# Patient Record
Sex: MALE | Race: BLACK OR AFRICAN AMERICAN | NOT HISPANIC OR LATINO | URBAN - METROPOLITAN AREA
[De-identification: names, ages, dates, MRNs, and addresses within clinical notes are randomized per-mention and may not be internally consistent; named-entity substitution may affect disease eponyms.]

---

## 2021-10-08 ENCOUNTER — INPATIENT (INPATIENT)
Facility: HOSPITAL | Age: 46
LOS: 3 days | Discharge: HOME | DRG: 247 | End: 2021-10-12
Attending: INTERNAL MEDICINE | Admitting: INTERNAL MEDICINE
Payer: COMMERCIAL

## 2021-10-08 VITALS
SYSTOLIC BLOOD PRESSURE: 164 MMHG | HEIGHT: 66 IN | HEART RATE: 79 BPM | WEIGHT: 199.96 LBS | RESPIRATION RATE: 18 BRPM | OXYGEN SATURATION: 100 % | DIASTOLIC BLOOD PRESSURE: 81 MMHG | TEMPERATURE: 98 F

## 2021-10-08 DIAGNOSIS — I10 ESSENTIAL (PRIMARY) HYPERTENSION: ICD-10-CM

## 2021-10-08 DIAGNOSIS — R07.9 CHEST PAIN, UNSPECIFIED: ICD-10-CM

## 2021-10-08 DIAGNOSIS — D49.0 NEOPLASM OF UNSPECIFIED BEHAVIOR OF DIGESTIVE SYSTEM: ICD-10-CM

## 2021-10-08 DIAGNOSIS — Z90.49 ACQUIRED ABSENCE OF OTHER SPECIFIED PARTS OF DIGESTIVE TRACT: Chronic | ICD-10-CM

## 2021-10-08 DIAGNOSIS — I25.10 ATHEROSCLEROTIC HEART DISEASE OF NATIVE CORONARY ARTERY WITHOUT ANGINA PECTORIS: ICD-10-CM

## 2021-10-08 DIAGNOSIS — Q24.8 OTHER SPECIFIED CONGENITAL MALFORMATIONS OF HEART: ICD-10-CM

## 2021-10-08 LAB
ALBUMIN SERPL ELPH-MCNC: 4 G/DL — SIGNIFICANT CHANGE UP (ref 3.3–5)
ALP SERPL-CCNC: 65 U/L — SIGNIFICANT CHANGE UP (ref 40–120)
ALT FLD-CCNC: 43 U/L — SIGNIFICANT CHANGE UP (ref 10–45)
ANION GAP SERPL CALC-SCNC: 7 MMOL/L — SIGNIFICANT CHANGE UP (ref 5–17)
APTT BLD: 29.5 SEC — SIGNIFICANT CHANGE UP (ref 27.5–35.5)
AST SERPL-CCNC: 29 U/L — SIGNIFICANT CHANGE UP (ref 10–40)
BASOPHILS # BLD AUTO: 0.02 K/UL — SIGNIFICANT CHANGE UP (ref 0–0.2)
BASOPHILS NFR BLD AUTO: 0.4 % — SIGNIFICANT CHANGE UP (ref 0–2)
BILIRUB SERPL-MCNC: 0.2 MG/DL — SIGNIFICANT CHANGE UP (ref 0.2–1.2)
BUN SERPL-MCNC: 9 MG/DL — SIGNIFICANT CHANGE UP (ref 7–23)
CALCIUM SERPL-MCNC: 9.5 MG/DL — SIGNIFICANT CHANGE UP (ref 8.4–10.5)
CHLORIDE SERPL-SCNC: 106 MMOL/L — SIGNIFICANT CHANGE UP (ref 96–108)
CO2 SERPL-SCNC: 28 MMOL/L — SIGNIFICANT CHANGE UP (ref 22–31)
CREAT SERPL-MCNC: 1.02 MG/DL — SIGNIFICANT CHANGE UP (ref 0.5–1.3)
D DIMER BLD IA.RAPID-MCNC: 534 NG/ML DDU — HIGH
EOSINOPHIL # BLD AUTO: 0.21 K/UL — SIGNIFICANT CHANGE UP (ref 0–0.5)
EOSINOPHIL NFR BLD AUTO: 4.7 % — SIGNIFICANT CHANGE UP (ref 0–6)
GLUCOSE SERPL-MCNC: 94 MG/DL — SIGNIFICANT CHANGE UP (ref 70–99)
HCT VFR BLD CALC: 39.4 % — SIGNIFICANT CHANGE UP (ref 39–50)
HGB BLD-MCNC: 12.2 G/DL — LOW (ref 13–17)
IMM GRANULOCYTES NFR BLD AUTO: 0 % — SIGNIFICANT CHANGE UP (ref 0–1.5)
INR BLD: 1.02 — SIGNIFICANT CHANGE UP (ref 0.88–1.16)
LYMPHOCYTES # BLD AUTO: 1.94 K/UL — SIGNIFICANT CHANGE UP (ref 1–3.3)
LYMPHOCYTES # BLD AUTO: 43.4 % — SIGNIFICANT CHANGE UP (ref 13–44)
MCHC RBC-ENTMCNC: 28.7 PG — SIGNIFICANT CHANGE UP (ref 27–34)
MCHC RBC-ENTMCNC: 31 GM/DL — LOW (ref 32–36)
MCV RBC AUTO: 92.7 FL — SIGNIFICANT CHANGE UP (ref 80–100)
MONOCYTES # BLD AUTO: 0.43 K/UL — SIGNIFICANT CHANGE UP (ref 0–0.9)
MONOCYTES NFR BLD AUTO: 9.6 % — SIGNIFICANT CHANGE UP (ref 2–14)
NEUTROPHILS # BLD AUTO: 1.87 K/UL — SIGNIFICANT CHANGE UP (ref 1.8–7.4)
NEUTROPHILS NFR BLD AUTO: 41.9 % — LOW (ref 43–77)
NRBC # BLD: 0 /100 WBCS — SIGNIFICANT CHANGE UP (ref 0–0)
PLATELET # BLD AUTO: 220 K/UL — SIGNIFICANT CHANGE UP (ref 150–400)
POTASSIUM SERPL-MCNC: 4.5 MMOL/L — SIGNIFICANT CHANGE UP (ref 3.5–5.3)
POTASSIUM SERPL-SCNC: 4.5 MMOL/L — SIGNIFICANT CHANGE UP (ref 3.5–5.3)
PROT SERPL-MCNC: 7.6 G/DL — SIGNIFICANT CHANGE UP (ref 6–8.3)
PROTHROM AB SERPL-ACNC: 12.2 SEC — SIGNIFICANT CHANGE UP (ref 10.6–13.6)
RBC # BLD: 4.25 M/UL — SIGNIFICANT CHANGE UP (ref 4.2–5.8)
RBC # FLD: 12 % — SIGNIFICANT CHANGE UP (ref 10.3–14.5)
SARS-COV-2 RNA SPEC QL NAA+PROBE: NEGATIVE — SIGNIFICANT CHANGE UP
SODIUM SERPL-SCNC: 141 MMOL/L — SIGNIFICANT CHANGE UP (ref 135–145)
TROPONIN T SERPL-MCNC: 0.01 NG/ML — SIGNIFICANT CHANGE UP (ref 0–0.01)
TROPONIN T SERPL-MCNC: <0.01 NG/ML — SIGNIFICANT CHANGE UP (ref 0–0.01)
WBC # BLD: 4.47 K/UL — SIGNIFICANT CHANGE UP (ref 3.8–10.5)
WBC # FLD AUTO: 4.47 K/UL — SIGNIFICANT CHANGE UP (ref 3.8–10.5)

## 2021-10-08 PROCEDURE — 71046 X-RAY EXAM CHEST 2 VIEWS: CPT | Mod: 26

## 2021-10-08 PROCEDURE — G1004: CPT

## 2021-10-08 PROCEDURE — 71275 CT ANGIOGRAPHY CHEST: CPT | Mod: 26,ME

## 2021-10-08 PROCEDURE — 99285 EMERGENCY DEPT VISIT HI MDM: CPT

## 2021-10-08 RX ORDER — ENOXAPARIN SODIUM 100 MG/ML
40 INJECTION SUBCUTANEOUS DAILY
Refills: 0 | Status: DISCONTINUED | OUTPATIENT
Start: 2021-10-08 | End: 2021-10-10

## 2021-10-08 RX ORDER — NITROGLYCERIN 6.5 MG
0.5 CAPSULE, EXTENDED RELEASE ORAL ONCE
Refills: 0 | Status: COMPLETED | OUTPATIENT
Start: 2021-10-08 | End: 2021-10-09

## 2021-10-08 RX ORDER — METOPROLOL TARTRATE 50 MG
25 TABLET ORAL DAILY
Refills: 0 | Status: DISCONTINUED | OUTPATIENT
Start: 2021-10-08 | End: 2021-10-12

## 2021-10-08 RX ORDER — ATORVASTATIN CALCIUM 80 MG/1
40 TABLET, FILM COATED ORAL AT BEDTIME
Refills: 0 | Status: DISCONTINUED | OUTPATIENT
Start: 2021-10-08 | End: 2021-10-10

## 2021-10-08 RX ORDER — ASPIRIN/CALCIUM CARB/MAGNESIUM 324 MG
81 TABLET ORAL DAILY
Refills: 0 | Status: DISCONTINUED | OUTPATIENT
Start: 2021-10-08 | End: 2021-10-10

## 2021-10-08 RX ORDER — ASPIRIN/CALCIUM CARB/MAGNESIUM 324 MG
324 TABLET ORAL ONCE
Refills: 0 | Status: COMPLETED | OUTPATIENT
Start: 2021-10-08 | End: 2021-10-08

## 2021-10-08 RX ORDER — NITROGLYCERIN 6.5 MG
0.4 CAPSULE, EXTENDED RELEASE ORAL ONCE
Refills: 0 | Status: COMPLETED | OUTPATIENT
Start: 2021-10-08 | End: 2021-10-08

## 2021-10-08 RX ADMIN — Medication 25 MILLIGRAM(S): at 21:19

## 2021-10-08 RX ADMIN — ATORVASTATIN CALCIUM 40 MILLIGRAM(S): 80 TABLET, FILM COATED ORAL at 22:40

## 2021-10-08 RX ADMIN — Medication 0.4 MILLIGRAM(S): at 20:22

## 2021-10-08 RX ADMIN — ENOXAPARIN SODIUM 40 MILLIGRAM(S): 100 INJECTION SUBCUTANEOUS at 22:39

## 2021-10-08 NOTE — ED ADULT NURSE NOTE - NS PRO AD NO ADVANCE DIRECTIVE
ANTICOAGULATION FOLLOW-UP CLINIC VISIT    Patient Name:  Gerri Owens  Date:  3/2/2020  Contact Type:  Face to Face    SUBJECTIVE:  Patient Findings     Positives:   Other complaints (diarrhea)    Comments:   Has recently had some issues with stomach pain and diarrhea again. She has no bleeding/bruising and no new changes in diet/meds/activity        Clinical Outcomes     Negatives:   Major bleeding event, Thromboembolic event, Anticoagulation-related hospital admission, Anticoagulation-related ED visit, Anticoagulation-related fatality    Comments:   Has recently had some issues with stomach pain and diarrhea again. She has no bleeding/bruising and no new changes in diet/meds/activity           OBJECTIVE    INR Protime   Date Value Ref Range Status   03/02/2020 3.4 (A) 0.86 - 1.14 Final       ASSESSMENT / PLAN  INR assessment SUPRA diarrhea   Recheck INR In: 4 WEEKS    INR Location Clinic      Anticoagulation Summary  As of 3/2/2020    INR goal:   2.0-3.0   TTR:   64.0 % (1 y)   INR used for dosing:   3.4! (3/2/2020)   Warfarin maintenance plan:   2.5 mg (5 mg x 0.5) every Mon, Fri; 5 mg (5 mg x 1) all other days   Full warfarin instructions:   3/2: Hold; Otherwise 2.5 mg every Mon, Fri; 5 mg all other days   Weekly warfarin total:   30 mg   Plan last modified:   Tena Castillo RN (10/14/2019)   Next INR check:   3/27/2020   Priority:   Maintenance   Target end date:       Indications    DVT of upper extremity (deep vein thrombosis) (H) (Resolved) [I82.629]  Pulmonary embolus (H) [I26.99]             Anticoagulation Episode Summary     INR check location:       Preferred lab:       Send INR reminders to:   ARIANNA KIMBLE    Comments:   MTV 30 mcg. Consistent spinach and broccoli intake. DVT subclavian vein 2012. PE 7/2018. Brother positive Factor V Leiden. Another brother on lifetime AC. 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       Anticoagulation Care Providers     Provider Role Specialty  Phone number    Karo Doty MD [facility] [phone]            See the Encounter Report to view Anticoagulation Flowsheet and Dosing Calendar (Go to Encounters tab in chart review, and find the Anticoagulation Therapy Visit)        Tena Castillo RN                  Yes

## 2021-10-08 NOTE — ED PROVIDER NOTE - ATTENDING CONTRIBUTION TO CARE
chest pain. vss. ecg no ischemic changes. pt with intermittent pain. no pain at time of evaluation. labs noted. troponin negative. d dimer elevated. cxr no acute pathology.  will get cta to r/o pe. case d/w with Dr Jarrett. and negative stress test in August. if persistent pain will plan on admission.    pt to be admitted for further mgmt of chest pain r/o acs - discussed with Dr. Enriquez

## 2021-10-08 NOTE — ED PROVIDER NOTE - NSICDXPASTMEDICALHX_GEN_ALL_CORE_FT
PAST MEDICAL HISTORY:  No pertinent past medical history       HTN (hypertension)     Myocardial infarction

## 2021-10-08 NOTE — H&P ADULT - NSHPLABSRESULTS_GEN_ALL_CORE
12.2   4.47  )-----------( 220      ( 08 Oct 2021 15:26 )             39.4   10-08    141  |  106  |  9   ----------------------------<  94  4.5   |  28  |  1.02    Ca    9.5      08 Oct 2021 15:26    TPro  7.6  /  Alb  4.0  /  TBili  0.2  /  DBili  x   /  AST  29  /  ALT  43  /  AlkPhos  65  10-08

## 2021-10-08 NOTE — H&P ADULT - HISTORY OF PRESENT ILLNESS
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 Patient reports pain started last evening. He had one short lived episode and then resolved spontaneously. Patient reports pain came back this morning while making breakfast. Pain has waxed and whined all day. Pain comes for about 15-20 minutes and resolves spontaneously. Pain is a pressure in the center of his chest with no radiation. He has had some mild SOB while pain occurs. Nausea has been present as well. He denies palpitations, dizziness, edema or syncope. Pain in 2015 prior to PCI was much worse but with some similarities. Initial troponin was negative and 12 Lead EKG shows SR with NSST changes. D-Dimer was elevated at 534. CTA was performed and was negative for PE. There was coronary atherosclerosis present as well as 1.8 cystic lesion at the right posterior pericardium.

## 2021-10-08 NOTE — ED PROVIDER NOTE - OBJECTIVE STATEMENT
37 y/o M pt w/ PMHx of prior MI in 2015, normal stress test in August 14th, 2021, and HTN, presents to the ED c/o an episode of CP that started around 8:00AM this morning. Reports sharp, stabbing pain in the center of the chest. Notes that he has had 4 episodes of CP today with associated SOB when episodes occur. Relates that his symptoms have improved and is asymptomatic in the ED. Denies HA, dizziness, back pain, nausea, vomiting, diarrhea, abdominal pain, and leg pain or swelling.

## 2021-10-08 NOTE — H&P ADULT - PROBLEM SELECTOR PLAN 4
S/p Appendectomy  Plan for collectomy +/- Chemo at [facility] 11/2021

## 2021-10-08 NOTE — ED PROVIDER NOTE - CLINICAL SUMMARY MEDICAL DECISION MAKING FREE TEXT BOX
chest pain. vss. ecg no ischemic changes. pt with intermittent pain. no pain at time of evaluation. labs noted. troponin negative. d dimer elevated. cxr no acute pathology.  will get cta to r/o pe. case d/w with Dr Jarrett. and negative stress test in August. if persistent pain will plan on admission.

## 2021-10-08 NOTE — H&P ADULT - PROBLEM SELECTOR PLAN 2
Admit to tele  Serial trops   Cont ASA  Cont BB  Check 2D echo  Patient with normal Nuc 8/2021  Likely will need LHC as patient will need cardiac clearance for upcoming colon surgery next month

## 2021-10-08 NOTE — ED ADULT NURSE NOTE - OBJECTIVE STATEMENT
presents to ED ambulatory complaining of chest pain.  Pt reports chest pain that started this morning after waking up.  Pt reports the pain is associated with nausea and shortness of breath. Pt has hx of MI in the past and has not had chest pain like this since that time.  Pt denies dizziness, vomiting, cough, fever, chills.

## 2021-10-08 NOTE — ED PROVIDER NOTE - PROGRESS NOTE DETAILS
Dr. Araiza progress note:  I assumed care for patient following signout from ROSELIA Prasad.  Patient continues to have chest discomfort.  ASA, nitro SL ordered and case is discussed with on call cardiologist, Dr. Katz, who agrees in setting of known CAD with stent (2015), active chest discomfort, etc patient warrants further in patient work up.  Patient is aware of plan and in agreement.  Will admit at this time.

## 2021-10-08 NOTE — ED ADULT TRIAGE NOTE - OTHER COMPLAINTS
pt here for midsternal CP and mild SOB since this am, non radiating, no affected by activity, reports hx of MI with stents in 2015, also recently diagnosed with appendix CA and awaiting treatment, NAD noted in triage

## 2021-10-08 NOTE — H&P ADULT - ASSESSMENT
Patient is 46 year old male with prior CAD and PCI of RPDA who presents with atypical chest pain, admitted to [facility] to R/O ACS

## 2021-10-08 NOTE — ED PROVIDER NOTE - NSICDXFAMILYHX_GEN_ALL_CORE_FT
FAMILY HISTORY:  Grandparent  Still living? No  Family history of acute myocardial infarction, Age at diagnosis: 71-80

## 2021-10-08 NOTE — H&P ADULT - ATTENDING COMMENTS
chest pain   ekg and troponin are normal  will trial NTG to see if it improves his symptoms given his history  unclear if this is unstable angina

## 2021-10-09 LAB
ANION GAP SERPL CALC-SCNC: 10 MMOL/L — SIGNIFICANT CHANGE UP (ref 5–17)
BUN SERPL-MCNC: 10 MG/DL — SIGNIFICANT CHANGE UP (ref 7–23)
CALCIUM SERPL-MCNC: 9.4 MG/DL — SIGNIFICANT CHANGE UP (ref 8.4–10.5)
CHLORIDE SERPL-SCNC: 104 MMOL/L — SIGNIFICANT CHANGE UP (ref 96–108)
CO2 SERPL-SCNC: 24 MMOL/L — SIGNIFICANT CHANGE UP (ref 22–31)
COVID-19 SPIKE DOMAIN AB INTERP: POSITIVE
COVID-19 SPIKE DOMAIN ANTIBODY RESULT: >250 U/ML — HIGH
CREAT SERPL-MCNC: 0.98 MG/DL — SIGNIFICANT CHANGE UP (ref 0.5–1.3)
CRP SERPL-MCNC: <3 MG/L — SIGNIFICANT CHANGE UP (ref 0–4)
GLUCOSE SERPL-MCNC: 92 MG/DL — SIGNIFICANT CHANGE UP (ref 70–99)
HCT VFR BLD CALC: 39.9 % — SIGNIFICANT CHANGE UP (ref 39–50)
HGB BLD-MCNC: 12.2 G/DL — LOW (ref 13–17)
MCHC RBC-ENTMCNC: 28 PG — SIGNIFICANT CHANGE UP (ref 27–34)
MCHC RBC-ENTMCNC: 30.6 GM/DL — LOW (ref 32–36)
MCV RBC AUTO: 91.5 FL — SIGNIFICANT CHANGE UP (ref 80–100)
NRBC # BLD: 0 /100 WBCS — SIGNIFICANT CHANGE UP (ref 0–0)
PLATELET # BLD AUTO: 192 K/UL — SIGNIFICANT CHANGE UP (ref 150–400)
POTASSIUM SERPL-MCNC: 4.1 MMOL/L — SIGNIFICANT CHANGE UP (ref 3.5–5.3)
POTASSIUM SERPL-SCNC: 4.1 MMOL/L — SIGNIFICANT CHANGE UP (ref 3.5–5.3)
RBC # BLD: 4.36 M/UL — SIGNIFICANT CHANGE UP (ref 4.2–5.8)
RBC # FLD: 12.3 % — SIGNIFICANT CHANGE UP (ref 10.3–14.5)
SARS-COV-2 IGG+IGM SERPL QL IA: >250 U/ML — HIGH
SARS-COV-2 IGG+IGM SERPL QL IA: POSITIVE
SODIUM SERPL-SCNC: 138 MMOL/L — SIGNIFICANT CHANGE UP (ref 135–145)
TROPONIN T SERPL-MCNC: 0.01 NG/ML — SIGNIFICANT CHANGE UP (ref 0–0.01)
TROPONIN T SERPL-MCNC: <0.01 NG/ML — SIGNIFICANT CHANGE UP (ref 0–0.01)
WBC # BLD: 4.22 K/UL — SIGNIFICANT CHANGE UP (ref 3.8–10.5)
WBC # FLD AUTO: 4.22 K/UL — SIGNIFICANT CHANGE UP (ref 3.8–10.5)

## 2021-10-09 PROCEDURE — 93306 TTE W/DOPPLER COMPLETE: CPT | Mod: 26

## 2021-10-09 PROCEDURE — 99223 1ST HOSP IP/OBS HIGH 75: CPT

## 2021-10-09 PROCEDURE — 93010 ELECTROCARDIOGRAM REPORT: CPT

## 2021-10-09 RX ORDER — NITROGLYCERIN 6.5 MG
0.5 CAPSULE, EXTENDED RELEASE ORAL ONCE
Refills: 0 | Status: COMPLETED | OUTPATIENT
Start: 2021-10-09 | End: 2021-10-09

## 2021-10-09 RX ORDER — FAMOTIDINE 10 MG/ML
20 INJECTION INTRAVENOUS ONCE
Refills: 0 | Status: COMPLETED | OUTPATIENT
Start: 2021-10-09 | End: 2021-10-09

## 2021-10-09 RX ORDER — ACETAMINOPHEN 500 MG
650 TABLET ORAL ONCE
Refills: 0 | Status: COMPLETED | OUTPATIENT
Start: 2021-10-09 | End: 2021-10-09

## 2021-10-09 RX ORDER — PANTOPRAZOLE SODIUM 20 MG/1
40 TABLET, DELAYED RELEASE ORAL
Refills: 0 | Status: DISCONTINUED | OUTPATIENT
Start: 2021-10-10 | End: 2021-10-12

## 2021-10-09 RX ORDER — NITROGLYCERIN 6.5 MG
0.5 CAPSULE, EXTENDED RELEASE ORAL
Refills: 0 | Status: DISCONTINUED | OUTPATIENT
Start: 2021-10-09 | End: 2021-10-10

## 2021-10-09 RX ADMIN — Medication 30 MILLILITER(S): at 14:22

## 2021-10-09 RX ADMIN — Medication 0.5 INCH(S): at 21:28

## 2021-10-09 RX ADMIN — FAMOTIDINE 20 MILLIGRAM(S): 10 INJECTION INTRAVENOUS at 14:22

## 2021-10-09 RX ADMIN — ATORVASTATIN CALCIUM 40 MILLIGRAM(S): 80 TABLET, FILM COATED ORAL at 21:29

## 2021-10-09 RX ADMIN — Medication 0.5 INCH(S): at 11:51

## 2021-10-09 RX ADMIN — Medication 25 MILLIGRAM(S): at 18:43

## 2021-10-09 RX ADMIN — Medication 0.5 INCH(S): at 09:41

## 2021-10-09 RX ADMIN — Medication 0.5 INCH(S): at 01:01

## 2021-10-09 RX ADMIN — Medication 650 MILLIGRAM(S): at 14:53

## 2021-10-09 RX ADMIN — ENOXAPARIN SODIUM 40 MILLIGRAM(S): 100 INJECTION SUBCUTANEOUS at 21:29

## 2021-10-09 RX ADMIN — Medication 0.5 INCH(S): at 22:09

## 2021-10-09 RX ADMIN — Medication 650 MILLIGRAM(S): at 15:50

## 2021-10-09 RX ADMIN — Medication 81 MILLIGRAM(S): at 11:53

## 2021-10-09 NOTE — PROGRESS NOTE ADULT - ASSESSMENT
Patient is 46 year old male with prior CAD and PCI of RPDA who presents with atypical chest pain, admitted to [facility] to R/O ACS

## 2021-10-09 NOTE — PROGRESS NOTE ADULT - SUBJECTIVE AND OBJECTIVE BOX
Patient started back on her Jardiance since her blood sugars were going up into the high 200's and she was not about to let her BS's get high since she is having a colonoscopy on 12.22. She decided to stay on this medication and then speak with her NP about weaning off of it and only being on the Actos. She had several questions about the prep before the colonoscopy and we reviewed some tips and tricks of how to get through it but have clear stools before having the procedure done. Plan: Will contact patient after her colonoscopy and check on how she is doing, if stable and results are normal will close the case.     Claudine Espinoza, MSN, BSN, RN  Nurse [facility]  [phone] Interventional Cardiology PA Adult Progress Note    cc: chest pain    Subjective Assessment: Seen and examined. Patient has been c/o on and off substernal chest pain, occasionally related to food intake. Denies SOB, dizziness.   No EKG changes. Trops negative  	     ROS negative except per subjective and HPI    MEDICATIONS:  metoprolol succinate ER 25 milliGRAM(s) Oral daily  nitroglycerin    2% Ointment 0.5 Inch(s) Transdermal two times a day          aluminum hydroxide/magnesium hydroxide/simethicone Suspension 30 milliLiter(s) Oral every 6 hours PRN  famotidine Injectable 20 milliGRAM(s) IV Push once    atorvastatin 40 milliGRAM(s) Oral at bedtime    aspirin  chewable 81 milliGRAM(s) Oral daily  enoxaparin Injectable 40 milliGRAM(s) SubCutaneous daily      	    [PHYSICAL EXAM:  TELEMETRY:  T(C): 37 (10-09-21 @ 09:46), Max: 37 (10-08-21 @ 20:09)  HR: 66 (10-09-21 @ 12:00) (59 - 79)  BP: 103/66 (10-09-21 @ 12:00) (94/63 - 164/81)  RR: 18 (10-09-21 @ 12:00) (14 - 18)  SpO2: 100% (10-09-21 @ 12:00) (98% - 100%)  Wt(kg): --  I&O's Summary    08 Oct 2021 07:01  -  09 Oct 2021 07:00  --------------------------------------------------------  IN: 170 mL / OUT: 200 mL / NET: -30 mL    09 Oct 2021 07:01  -  09 Oct 2021 13:37  --------------------------------------------------------  IN: 180 mL / OUT: 200 mL / NET: -20 mL      Height (cm): 167.6 (10-09 @ 00:13)  Weight (kg): 92.4 (10-09 @ 00:13)  BMI (kg/m2): 32.9 (10-09 @ 00:13)  BSA (m2): 2.02 (10-09 @ 00:13)  [place]rete:  Central/PICC/Mid Line:                                         Appearance: Normal	  HEENT:   Normal oral mucosa, PERRL, EOMI	  Neck: Supple, - JVD;   Cardiovascular: Normal S1 S2, No JVD, No murmurs,   Respiratory: Lungs clear to auscultation, No Rales, Rhonchi, Wheezing	  Gastrointestinal:  Soft, Non-tender, bowel sounds present	  Skin: No rashes, No ecchymoses, No cyanosis  Extremities: Normal range of motion, No clubbing, cyanosis or edema  Neurologic: Non-focal  Psychiatry: A & O x 3, Mood & affect appropriate      	    LABS:	 	  CARDIAC MARKERS:                                  12.2   4.22  )-----------( 192      ( 09 Oct 2021 06:58 )             39.9     10-09    138  |  104  |  10  ----------------------------<  92  4.1   |  24  |  0.98    Ca    9.4      09 Oct 2021 06:58    TPro  7.6  /  Alb  4.0  /  TBili  0.2  /  DBili  x   /  AST  29  /  ALT  43  /  AlkPhos  65  10-08      PT/INR - ( 08 Oct 2021 15:26 )   PT: 12.2 sec;   INR: 1.02          PTT - ( 08 Oct 2021 15:26 )  PTT:29.5 sec     Interventional Cardiology PA Adult Progress Note    cc: chest pain    Subjective Assessment: Seen and examined. Patient has been c/o on and off substernal chest pain, occasionally related to food intake. Denies SOB, dizziness.   No EKG changes. Trops negative. Given NTG paste, Pepcid IV 20 mg x 1 and Mylanta    At 3 PM Pt c/o chest pain. EKG showed new Tw inversions leads II, III, V4-V6  	     ROS negative except per subjective and HPI    MEDICATIONS:  metoprolol succinate ER 25 milliGRAM(s) Oral daily  nitroglycerin    2% Ointment 0.5 Inch(s) Transdermal two times a day          aluminum hydroxide/magnesium hydroxide/simethicone Suspension 30 milliLiter(s) Oral every 6 hours PRN  famotidine Injectable 20 milliGRAM(s) IV Push once    atorvastatin 40 milliGRAM(s) Oral at bedtime    aspirin  chewable 81 milliGRAM(s) Oral daily  enoxaparin Injectable 40 milliGRAM(s) SubCutaneous daily      	    [PHYSICAL EXAM:  TELEMETRY:  T(C): 37 (10-09-21 @ 09:46), Max: 37 (10-08-21 @ 20:09)  HR: 66 (10-09-21 @ 12:00) (59 - 79)  BP: 103/66 (10-09-21 @ 12:00) (94/63 - 164/81)  RR: 18 (10-09-21 @ 12:00) (14 - 18)  SpO2: 100% (10-09-21 @ 12:00) (98% - 100%)  Wt(kg): --  I&O's Summary    08 Oct 2021 07:01  -  09 Oct 2021 07:00  --------------------------------------------------------  IN: 170 mL / OUT: 200 mL / NET: -30 mL    09 Oct 2021 07:01  -  09 Oct 2021 13:37  --------------------------------------------------------  IN: 180 mL / OUT: 200 mL / NET: -20 mL      Height (cm): 167.6 (10-09 @ 00:13)  Weight (kg): 92.4 (10-09 @ 00:13)  BMI (kg/m2): 32.9 (10-09 @ 00:13)  BSA (m2): 2.02 (10-09 @ 00:13)  [place]erget[place]:  Central/PICC/Mid Line:                                         Appearance: Normal	  HEENT:   Normal oral mucosa, PERRL, EOMI	  Neck: Supple, - JVD;   Cardiovascular: Normal S1 S2, No JVD, No murmurs,   Respiratory: Lungs clear to auscultation, No Rales, Rhonchi, Wheezing	  Gastrointestinal:  Soft, Non-tender, bowel sounds present	  Skin: No rashes, No ecchymoses, No cyanosis  Extremities: Normal range of motion, No clubbing, cyanosis or edema  Neurologic: Non-focal  Psychiatry: A & O x 3, Mood & affect appropriate        Echo: < from: TTE Echo Complete w/o Contrast w/ Doppler (10.09.21 @ 12:38) >     1. The left ventricle is normal in size, wall thickness, and systolic function with a calculated ejection fraction of 55-60%.   2. Normal right ventricular size and systolic function.   3. No significant valvular disease.   4. No pericardial effusion.        	    LABS:	 	  CARDIAC MARKERS:                                  12.2   4.22  )-----------( 192      ( 09 Oct 2021 06:58 )             39.9     10-09    138  |  104  |  10  ----------------------------<  92  4.1   |  24  |  0.98    Ca    9.4      09 Oct 2021 06:58    TPro  7.6  /  Alb  4.0  /  TBili  0.2  /  DBili  x   /  AST  29  /  ALT  43  /  AlkPhos  65  10-08      PT/INR - ( 08 Oct 2021 15:26 )   PT: 12.2 sec;   INR: 1.02          PTT - ( 08 Oct 2021 15:26 )  PTT:29.5 sec

## 2021-10-09 NOTE — PROGRESS NOTE ADULT - PROBLEM SELECTOR PLAN 2
f/u 2D echo report  Patient with normal Nuc 8/2021  EKG normal  Trops negative  - consider indigestion: will give Pepcid 20 mg IV x 1, Mylanta po and start Protonix 40 mg po daily tomorrow f/u 2D echo report see above  Patient with normal Nuc 8/2021  EKG changes today at 3 PM  Trops repeat pending

## 2021-10-09 NOTE — PATIENT PROFILE ADULT - VISION (WITH CORRECTIVE LENSES IF THE PATIENT USUALLY WEARS THEM):
daily wear/Partially impaired: cannot see medication labels or newsprint, but can see obstacles in path, and the surrounding layout; can count fingers at arm's length

## 2021-10-10 LAB
ANION GAP SERPL CALC-SCNC: 9 MMOL/L — SIGNIFICANT CHANGE UP (ref 5–17)
APTT BLD: 33.5 SEC — SIGNIFICANT CHANGE UP (ref 27.5–35.5)
BUN SERPL-MCNC: 15 MG/DL — SIGNIFICANT CHANGE UP (ref 7–23)
CALCIUM SERPL-MCNC: 9.4 MG/DL — SIGNIFICANT CHANGE UP (ref 8.4–10.5)
CHLORIDE SERPL-SCNC: 105 MMOL/L — SIGNIFICANT CHANGE UP (ref 96–108)
CHOLEST SERPL-MCNC: 173 MG/DL — SIGNIFICANT CHANGE UP
CO2 SERPL-SCNC: 28 MMOL/L — SIGNIFICANT CHANGE UP (ref 22–31)
CREAT SERPL-MCNC: 1.24 MG/DL — SIGNIFICANT CHANGE UP (ref 0.5–1.3)
ERYTHROCYTE [SEDIMENTATION RATE] IN BLOOD: 40 MM/HR — HIGH
GLUCOSE SERPL-MCNC: 97 MG/DL — SIGNIFICANT CHANGE UP (ref 70–99)
HCT VFR BLD CALC: 42.5 % — SIGNIFICANT CHANGE UP (ref 39–50)
HDLC SERPL-MCNC: 43 MG/DL — SIGNIFICANT CHANGE UP
HGB BLD-MCNC: 12.8 G/DL — LOW (ref 13–17)
INR BLD: 1.01 — SIGNIFICANT CHANGE UP (ref 0.88–1.16)
LIPID PNL WITH DIRECT LDL SERPL: 109 MG/DL — HIGH
MAGNESIUM SERPL-MCNC: 2.1 MG/DL — SIGNIFICANT CHANGE UP (ref 1.6–2.6)
MCHC RBC-ENTMCNC: 28.1 PG — SIGNIFICANT CHANGE UP (ref 27–34)
MCHC RBC-ENTMCNC: 30.1 GM/DL — LOW (ref 32–36)
MCV RBC AUTO: 93.4 FL — SIGNIFICANT CHANGE UP (ref 80–100)
NON HDL CHOLESTEROL: 130 MG/DL — HIGH
NRBC # BLD: 0 /100 WBCS — SIGNIFICANT CHANGE UP (ref 0–0)
PLATELET # BLD AUTO: 178 K/UL — SIGNIFICANT CHANGE UP (ref 150–400)
POTASSIUM SERPL-MCNC: 4.2 MMOL/L — SIGNIFICANT CHANGE UP (ref 3.5–5.3)
POTASSIUM SERPL-SCNC: 4.2 MMOL/L — SIGNIFICANT CHANGE UP (ref 3.5–5.3)
PROTHROM AB SERPL-ACNC: 12.1 SEC — SIGNIFICANT CHANGE UP (ref 10.6–13.6)
RBC # BLD: 4.55 M/UL — SIGNIFICANT CHANGE UP (ref 4.2–5.8)
RBC # FLD: 12.3 % — SIGNIFICANT CHANGE UP (ref 10.3–14.5)
SODIUM SERPL-SCNC: 142 MMOL/L — SIGNIFICANT CHANGE UP (ref 135–145)
TRIGL SERPL-MCNC: 105 MG/DL — SIGNIFICANT CHANGE UP
WBC # BLD: 4.19 K/UL — SIGNIFICANT CHANGE UP (ref 3.8–10.5)
WBC # FLD AUTO: 4.19 K/UL — SIGNIFICANT CHANGE UP (ref 3.8–10.5)

## 2021-10-10 PROCEDURE — 99233 SBSQ HOSP IP/OBS HIGH 50: CPT

## 2021-10-10 RX ORDER — SODIUM CHLORIDE 9 MG/ML
500 INJECTION INTRAMUSCULAR; INTRAVENOUS; SUBCUTANEOUS
Refills: 0 | Status: DISCONTINUED | OUTPATIENT
Start: 2021-10-11 | End: 2021-10-11

## 2021-10-10 RX ORDER — HEPARIN SODIUM 5000 [USP'U]/ML
5000 INJECTION INTRAVENOUS; SUBCUTANEOUS ONCE
Refills: 0 | Status: COMPLETED | OUTPATIENT
Start: 2021-10-10 | End: 2021-10-10

## 2021-10-10 RX ORDER — ASPIRIN/CALCIUM CARB/MAGNESIUM 324 MG
81 TABLET ORAL DAILY
Refills: 0 | Status: DISCONTINUED | OUTPATIENT
Start: 2021-10-10 | End: 2021-10-12

## 2021-10-10 RX ORDER — MORPHINE SULFATE 50 MG/1
2 CAPSULE, EXTENDED RELEASE ORAL ONCE
Refills: 0 | Status: DISCONTINUED | OUTPATIENT
Start: 2021-10-10 | End: 2021-10-10

## 2021-10-10 RX ORDER — ATORVASTATIN CALCIUM 80 MG/1
80 TABLET, FILM COATED ORAL AT BEDTIME
Refills: 0 | Status: DISCONTINUED | OUTPATIENT
Start: 2021-10-10 | End: 2021-10-12

## 2021-10-10 RX ORDER — NITROGLYCERIN 6.5 MG
0.5 CAPSULE, EXTENDED RELEASE ORAL
Refills: 0 | Status: DISCONTINUED | OUTPATIENT
Start: 2021-10-10 | End: 2021-10-12

## 2021-10-10 RX ADMIN — Medication 50 MILLIGRAM(S): at 23:39

## 2021-10-10 RX ADMIN — Medication 0.5 INCH(S): at 17:46

## 2021-10-10 RX ADMIN — Medication 81 MILLIGRAM(S): at 13:21

## 2021-10-10 RX ADMIN — Medication 0.5 INCH(S): at 10:08

## 2021-10-10 RX ADMIN — Medication 25 MILLIGRAM(S): at 17:46

## 2021-10-10 RX ADMIN — ATORVASTATIN CALCIUM 80 MILLIGRAM(S): 80 TABLET, FILM COATED ORAL at 21:21

## 2021-10-10 RX ADMIN — HEPARIN SODIUM 5000 UNIT(S): 5000 INJECTION INTRAVENOUS; SUBCUTANEOUS at 21:21

## 2021-10-10 RX ADMIN — Medication 50 MILLIGRAM(S): at 17:47

## 2021-10-10 RX ADMIN — Medication 0.5 INCH(S): at 20:35

## 2021-10-10 RX ADMIN — PANTOPRAZOLE SODIUM 40 MILLIGRAM(S): 20 TABLET, DELAYED RELEASE ORAL at 05:51

## 2021-10-10 RX ADMIN — Medication 81 MILLIGRAM(S): at 11:43

## 2021-10-10 NOTE — PROGRESS NOTE ADULT - ASSESSMENT
Patient is 46 year old male with prior CAD and PCI of RPDA who presents with atypical chest pain, admitted to [facility] to R/O ACS 45 yo M with PMHx HLD, CAD s/p NSTEMI    and PCI of RPDA who presents with atypical chest pain, admitted to [facility] to R/O ACS 47 yo M with PMHx HLD, CAD s/p NSTEMI (s/p cardiac cath 10/2015 revealing dual PDA system with 100% prox thrombotic occlusion of most inferior PDA s/p thrombectomy/JENN & insertion of IABP for coronary perfusion x 18 hours, residual pRamus 50% eccentric disease), recent appendectomy for appendicitis 9/2021 and 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 Cameron) who presented with atypical chest pain, trop neg x2, ECG with TWI in inferior leads, plan for cardiac cath with Dr. Boyle tomorrow.

## 2021-10-10 NOTE — PROGRESS NOTE ADULT - PROBLEM SELECTOR PLAN 4
S/p Appendectomy  Plan for collectomy +/- Chemo at [facility] 11/2021
S/p Appendectomy  Plan for collectomy +/- Chemo at [facility] 11/2021

## 2021-10-10 NOTE — PROGRESS NOTE ADULT - PROBLEM SELECTOR PLAN 2
f/u 2D echo report see above  Patient with normal Nuc 8/2021  EKG changes today at 3 PM  Trops repeat pending S/p Appendectomy with finding of appendicle mallignancy  - Plan for collectomy +/- Chemo at [facility] 11/2021  - Plan to reach out to Dr. Freed 10/11 as above

## 2021-10-10 NOTE — PROGRESS NOTE ADULT - SUBJECTIVE AND OBJECTIVE BOX
Interventional Cardiology PA Adult Progress Note    Subjective Assessment:  Pt seen and evaluated at bedside. No chest pain last night or this AM. c/o ongoing episodes of nausea since appendectomy. Wondering if he will be going for cath tomorrow. Denies SOB, dizziness, palpitations, N/V, abdominal pain. All other ROS otherwise negative except per subjective.   	  MEDICATIONS:  metoprolol succinate ER 25 milliGRAM(s) Oral daily  aluminum hydroxide/magnesium hydroxide/simethicone Suspension 30 milliLiter(s) Oral every 6 hours PRN  pantoprazole    Tablet 40 milliGRAM(s) Oral before breakfast  atorvastatin 40 milliGRAM(s) Oral at bedtime  aspirin  chewable 81 milliGRAM(s) Oral daily  enoxaparin Injectable 40 milliGRAM(s) SubCutaneous daily      [PHYSICAL EXAM:  TELEMETRY:  T(C): 36.7 (10-10-21 @ 06:22), Max: 37.4 (10-09-21 @ 18:07)  HR: 66 (10-10-21 @ 10:05) (63 - 87)  BP: 125/73 (10-10-21 @ 10:05) (92/61 - 125/73)  RR: 18 (10-10-21 @ 10:05) (17 - 18)  SpO2: 96% (10-10-21 @ 10:05) (96% - 100%)  Wt(kg): --  I&O's Summary    09 Oct 2021 07:01  -  10 Oct 2021 07:00  --------------------------------------------------------  IN: 600 mL / OUT: 200 mL / NET: 400 mL    Appearance: man sitting in bed in NAD  HEENT:   EOMI	  Neck:  - JVD  Cardiovascular: Normal S1 S2, No murmurs,   Respiratory: CTA B/L, no w/r/r  Gastrointestinal:  Soft, Non-tender, + BS	x4  Skin: No rashes, No ecchymoses, No cyanosis  Extremities: Normal range of motion, No pedal edema B/L  Vascular: DP/PT 1+ B/L  Neurologic: Non-focal  Psychiatry: A & O x 3, Mood & affect appropriate                            12.8   4.19  )-----------( 178      ( 10 Oct 2021 06:05 )             42.5     10-10    142  |  105  |  15  ----------------------------<  97  4.2   |  28  |  1.24    Ca    9.4      10 Oct 2021 06:05  Mg     2.1     10-10    TPro  7.6  /  Alb  4.0  /  TBili  0.2  /  DBili  x   /  AST  29  /  ALT  43  /  AlkPhos  65  10-08  PT/INR - ( 10 Oct 2021 06:05 )   PT: 12.1 sec;   INR: 1.01     PTT - ( 10 Oct 2021 06:05 )  PTT:33.5 sec

## 2021-10-10 NOTE — PROGRESS NOTE ADULT - PROBLEM SELECTOR PLAN 3
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 Katz & curbsided Dr. Rodriguez, likely not causing sx.    F: NS @ 75 cc/hr x 4 hours pre-cath  E: Replete K+<4 and/or Mg < 2  N: DASH    DVT prophylaxis: Lovenox 40 mg SUBQ QD, transitioning to Heparin SUBQ pending cath  Dispo: pending cath    Case d/w Dr. Katz

## 2021-10-10 NOTE — PROGRESS NOTE ADULT - PROBLEM SELECTOR PLAN 1
Hx of premature CAD S/P JENN to RPDA in 2015  Cont STATIN Episode of SSCP with GAB inferolateral leads   - Trop neg x3  - ECHO 10/9/21: LVEF 55-60%,no significant valvular disease.   - CXR 10/8/21: No acute cardiopulmonary disease process.  - CT PE 10/8/21: no PE  - Pt endorses normal NST 8/2021.   - Cardiac cath @ [facility] with Dr. Boyle 10/2015: LMCA normal, LAD mild LI, pLCx LI, pRamus 50%, dual PDA system with 100% prox thrombotic occlusion of most inferior PDA s/p thrombectomy/JENN with insertion of IABP x 18 hours post-procedure.   - Received ASA 81 mg x2 since admit, will provide additional dose today and then continue 10/11 AM to complete loading dose. Plan to reach out to Surgeon Dr. Freed 10/11 AM to ensure they are able push back colectomy x3 months if severe lesion found on cath. Load with Plavix tomorrow pending discussion with Dr. Freed.   - Takes Crestor 10  mg PO QD at home, Atorvastatin titrated to 80 mg PO QD (plan to discharge on Crestor 40 mg PO QD). c/w Toprol XL 25 mg PO QD. Episode of SSCP improved with NTG paste on 10/9/21  - Trop neg x3  - ECHO 10/9/21: LVEF 55-60%,no significant valvular disease.   - CXR 10/8/21: No acute cardiopulmonary disease process.  - ECG 10/10/21: NSR @ 73 BPM with TWI/Q waves in inferior leads (flattened T waves in inferior leads on admit)  - CT PE 10/8/21: no PE  - Pt endorses normal NST 8/2021.   - Cardiac cath @ [facility] with Dr. Boyle 10/2015: LMCA normal, LAD mild LI, pLCx LI, pRamus 50%, dual PDA system with 100% prox thrombotic occlusion of most inferior PDA s/p thrombectomy/JENN with insertion of IABP x 18 hours post-procedure.   - Received ASA 81 mg x2 since admit, will provide additional dose today and then continue 10/11 AM to complete loading dose. Plan to reach out to Surgeon Dr. Freed 10/11 AM to ensure they are able push back colectomy x3 months if severe lesion found on cath. Load with Plavix tomorrow pending discussion with Dr. Freed.   - Takes Crestor 10  mg PO QD at home, Atorvastatin titrated to 80 mg PO QD (plan to discharge on Crestor 40 mg PO QD). c/w Toprol XL 25 mg PO QD.

## 2021-10-11 ENCOUNTER — TRANSCRIPTION ENCOUNTER (OUTPATIENT)
Age: 46
End: 2021-10-11

## 2021-10-11 LAB
ANION GAP SERPL CALC-SCNC: 12 MMOL/L — SIGNIFICANT CHANGE UP (ref 5–17)
APTT BLD: 32.2 SEC — SIGNIFICANT CHANGE UP (ref 27.5–35.5)
BUN SERPL-MCNC: 13 MG/DL — SIGNIFICANT CHANGE UP (ref 7–23)
CALCIUM SERPL-MCNC: 9.6 MG/DL — SIGNIFICANT CHANGE UP (ref 8.4–10.5)
CHLORIDE SERPL-SCNC: 105 MMOL/L — SIGNIFICANT CHANGE UP (ref 96–108)
CO2 SERPL-SCNC: 25 MMOL/L — SIGNIFICANT CHANGE UP (ref 22–31)
CREAT SERPL-MCNC: 0.94 MG/DL — SIGNIFICANT CHANGE UP (ref 0.5–1.3)
GLUCOSE SERPL-MCNC: 128 MG/DL — HIGH (ref 70–99)
HCT VFR BLD CALC: 42.7 % — SIGNIFICANT CHANGE UP (ref 39–50)
HGB BLD-MCNC: 13.4 G/DL — SIGNIFICANT CHANGE UP (ref 13–17)
INR BLD: 1.06 — SIGNIFICANT CHANGE UP (ref 0.88–1.16)
MAGNESIUM SERPL-MCNC: 2 MG/DL — SIGNIFICANT CHANGE UP (ref 1.6–2.6)
MCHC RBC-ENTMCNC: 28.9 PG — SIGNIFICANT CHANGE UP (ref 27–34)
MCHC RBC-ENTMCNC: 31.4 GM/DL — LOW (ref 32–36)
MCV RBC AUTO: 92.2 FL — SIGNIFICANT CHANGE UP (ref 80–100)
NRBC # BLD: 0 /100 WBCS — SIGNIFICANT CHANGE UP (ref 0–0)
PLATELET # BLD AUTO: 188 K/UL — SIGNIFICANT CHANGE UP (ref 150–400)
POTASSIUM SERPL-MCNC: 4.8 MMOL/L — SIGNIFICANT CHANGE UP (ref 3.5–5.3)
POTASSIUM SERPL-SCNC: 4.8 MMOL/L — SIGNIFICANT CHANGE UP (ref 3.5–5.3)
PROTHROM AB SERPL-ACNC: 12.7 SEC — SIGNIFICANT CHANGE UP (ref 10.6–13.6)
RBC # BLD: 4.63 M/UL — SIGNIFICANT CHANGE UP (ref 4.2–5.8)
RBC # FLD: 12.2 % — SIGNIFICANT CHANGE UP (ref 10.3–14.5)
SODIUM SERPL-SCNC: 142 MMOL/L — SIGNIFICANT CHANGE UP (ref 135–145)
WBC # BLD: 7.76 K/UL — SIGNIFICANT CHANGE UP (ref 3.8–10.5)
WBC # FLD AUTO: 7.76 K/UL — SIGNIFICANT CHANGE UP (ref 3.8–10.5)

## 2021-10-11 PROCEDURE — 99152 MOD SED SAME PHYS/QHP 5/>YRS: CPT

## 2021-10-11 PROCEDURE — 92928 PRQ TCAT PLMT NTRAC ST 1 LES: CPT | Mod: RI

## 2021-10-11 PROCEDURE — 93458 L HRT ARTERY/VENTRICLE ANGIO: CPT | Mod: 26,59

## 2021-10-11 PROCEDURE — 92920 PRQ TRLUML C ANGIOP 1ART&/BR: CPT | Mod: RC

## 2021-10-11 PROCEDURE — 99233 SBSQ HOSP IP/OBS HIGH 50: CPT

## 2021-10-11 RX ORDER — CLOPIDOGREL BISULFATE 75 MG/1
600 TABLET, FILM COATED ORAL ONCE
Refills: 0 | Status: COMPLETED | OUTPATIENT
Start: 2021-10-11 | End: 2021-10-11

## 2021-10-11 RX ORDER — CLOPIDOGREL BISULFATE 75 MG/1
75 TABLET, FILM COATED ORAL DAILY
Refills: 0 | Status: DISCONTINUED | OUTPATIENT
Start: 2021-10-12 | End: 2021-10-12

## 2021-10-11 RX ORDER — DIPHENHYDRAMINE HCL 50 MG
50 CAPSULE ORAL ONCE
Refills: 0 | Status: DISCONTINUED | OUTPATIENT
Start: 2021-10-11 | End: 2021-10-12

## 2021-10-11 RX ORDER — SODIUM CHLORIDE 9 MG/ML
1000 INJECTION INTRAMUSCULAR; INTRAVENOUS; SUBCUTANEOUS
Refills: 0 | Status: DISCONTINUED | OUTPATIENT
Start: 2021-10-11 | End: 2021-10-12

## 2021-10-11 RX ADMIN — CLOPIDOGREL BISULFATE 600 MILLIGRAM(S): 75 TABLET, FILM COATED ORAL at 13:50

## 2021-10-11 RX ADMIN — Medication 25 MILLIGRAM(S): at 22:16

## 2021-10-11 RX ADMIN — Medication 0.5 INCH(S): at 05:59

## 2021-10-11 RX ADMIN — PANTOPRAZOLE SODIUM 40 MILLIGRAM(S): 20 TABLET, DELAYED RELEASE ORAL at 06:01

## 2021-10-11 RX ADMIN — Medication 0.5 INCH(S): at 06:02

## 2021-10-11 RX ADMIN — SODIUM CHLORIDE 75 MILLILITER(S): 9 INJECTION INTRAMUSCULAR; INTRAVENOUS; SUBCUTANEOUS at 00:01

## 2021-10-11 RX ADMIN — Medication 50 MILLIGRAM(S): at 13:50

## 2021-10-11 RX ADMIN — Medication 50 MILLIGRAM(S): at 06:01

## 2021-10-11 RX ADMIN — Medication 81 MILLIGRAM(S): at 06:03

## 2021-10-11 RX ADMIN — ATORVASTATIN CALCIUM 80 MILLIGRAM(S): 80 TABLET, FILM COATED ORAL at 22:16

## 2021-10-11 NOTE — PROGRESS NOTE ADULT - TIME BILLING
chest pain due to cad
cardiac chest pain feels better has new Twave inversions  will cath
chest pain with new t wave inversions  increase atorvastatin   he is on bb   hydrate  plan on dc on lisinopril 2.5 mg once a day

## 2021-10-11 NOTE — PROGRESS NOTE ADULT - PROBLEM SELECTOR PLAN 3
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 Katz & curbsided Dr. Rodriguez, likely not causing sx.    F: NS @ 75 cc/hr x 4 hours pre-cath  E: Replete K+<4 and/or Mg < 2  N: DASH    DVT prophylaxis: Holding for cath  Dispo: pending cath    Case d/w Dr. Katz, Dr. Becker

## 2021-10-11 NOTE — PROGRESS NOTE ADULT - PROBLEM SELECTOR PLAN 1
Episode of SSCP improved with NTG paste on 10/9/21 & 10/10/21, CP free today  - Trop neg x3  - ECHO 10/9/21: LVEF 55-60%,no significant valvular disease.   - CXR 10/8/21: No acute cardiopulmonary disease process.  - ECG 10/10/21: NSR @ 73 BPM with TWI/Q waves in inferior leads (flattened T waves in inferior leads on admit)  - CT PE 10/8/21: no PE  - Pt endorses normal NST 8/2021.   - Cardiac cath @ [facility] with Dr. Boyle 10/2015: LMCA normal, LAD mild LI, pLCx LI, pRamus 50%, dual PDA system with 100% prox thrombotic occlusion of most inferior PDA s/p thrombectomy/JENN with insertion of IABP x 18 hours post-procedure.   - Spoke to Surgeon Dr. Cameron's team 10/11 AM, OK to go forward with cath today, will have to push colectomy if severe disease requiring stent, communicate with Dr. Cameron's team re: results of cath  - s/p ASA/Plavix load. Plan for cath today with Dr. Boyle. Consented. Added to schedule.   - Takes Crestor 10  mg PO QD at home, Atorvastatin titrated to 80 mg PO QD (plan to discharge on Crestor 40 mg PO QD).   - c/w Toprol XL 25 mg PO QD. Will need low dose ACEi/ARB prior to discharge given previous NSTEMI

## 2021-10-11 NOTE — PROGRESS NOTE ADULT - ATTENDING COMMENTS
another episode of chest pain   will cath   he is on gdmt
Has new t wave inversions  will cath  I will reach out to his surgeon to ensure if we need to hold surgery for three months
chest pain   will cath

## 2021-10-11 NOTE — PROGRESS NOTE ADULT - PROBLEM SELECTOR PLAN 2
S/p Appendectomy with finding of appendicle malignancy  - Plan for collectomy +/- Chemo at [facility] 11/2021  - Plan to reach out to Dr. Freed's team with results of cath

## 2021-10-11 NOTE — DISCHARGE NOTE PROVIDER - PROVIDER TOKENS
PROVIDER:[TOKEN:[[id number]]],FREE:[LAST:[Abdiaziz],FIRST:[Dr. Vidal],PHONE:[phone]:[(   [phone]    -],ADDRESS:[[facility] Surgeon  [address] [place] [address]]]

## 2021-10-11 NOTE — DISCHARGE NOTE PROVIDER - NSDCMRMEDTOKEN_GEN_ALL_CORE_FT
aspirin 81 mg oral tablet, chewable: 1 tab(s) orally once a day  Crestor 10 mg oral tablet: 1 tab(s) orally once a day  metoprolol succinate 25 mg oral tablet, extended release: 1 tab(s) orally once a day   aspirin 81 mg oral delayed release tablet: 1 tab(s) orally once a day  cardiac rehabilitation: cardiac rehabilitation 3x/week x 12 weeks    DX: CAD  Cardiologist: Dr. Gopi Enriquez  clopidogrel 75 mg oral tablet: 1 tab(s) orally once a day  Crestor 20 mg oral tablet: 1 tab(s) orally once a day (at bedtime)   losartan 25 mg oral tablet: 1 tab(s) orally once a day  metoprolol succinate 25 mg oral tablet, extended release: 1 tab(s) orally once a day

## 2021-10-11 NOTE — PROGRESS NOTE ADULT - SUBJECTIVE AND OBJECTIVE BOX
Precath Checklist      Allergies:  amoxicillin (Anaphylaxis)  peanuts (Pruritus; Angioedema)  shellfish (Swelling; Short breath)      Shellfish/Contrast Allergies?  Yes  Premedication Ordered: [ X ] Yes        PULSES:	   	            R	                FEM         	                                 DP/PT  Right		            2+                  No     Bruit	                                  2+/1+         Left		           2+                     No     Bruit                                        2+/1+                                13.4   7.76  )-----------( 188      ( 11 Oct 2021 08:04 )             42.7     10-11    142  |  105  |  13  ----------------------------<  128<H>  4.8   |  25  |  0.94    Ca    9.6      11 Oct 2021 08:04  Mg     2.0     10-11      CARDIAC MARKERS ( 09 Oct 2021 15:17 )  x     / 0.01 ng/mL / x     / x     / x          Prothrombin Time, Plasma: 12.7 sec [10.6 - 13.6] (10-11-21 @ 08:04)  INR: 1.06 [0.88 - 1.16] (10-11-21 @ 08:04)  Activated Partial Thromboplastin Time: 32.2 sec [27.5 - 35.5] (10-11-21 @ 08:04)  Activated Partial Thromboplastin Time: 33.5 sec [27.5 - 35.5] (10-10-21 @ 06:05)  Prothrombin Time, Plasma: 12.1 sec [10.6 - 13.6] (10-10-21 @ 06:05)  INR: 1.01 [0.88 - 1.16] (10-10-21 @ 06:05)  Prothrombin Time, Plasma: 12.2 sec [10.6 - 13.6] (10-08-21 @ 15:26)  INR: 1.02 [0.88 - 1.16] (10-08-21 @ 15:26)  Activated Partial Thromboplastin Time: 29.5 sec [27.5 - 35.5] (10-08-21 @ 15:26)      COVID Result within 48-72hours:   COVID-19 PCR: Negative (10-08-21 @ 15:26)         ECG 10/10/21: NSR @ 73 BPM with TWI/Q waves in inferior leads (flattened T waves in inferior leads on admit)    ASA III				Mallampati class: III	            Anginal Class: IV      Sedation Plan:   [  ] None   [X  ] Moderate   [  ]  Deep    [  ]  General Anesthesia   Patient Is Suitable Candidate For Sedation?     [ X ] Yes   [  ] No   [  ] Not Applicable   Cath Order Entered: [ X ] Yes  DAPT LOAD Ordered: [ X ] Yes  [  ] No load 2/2 ________  Pre-Cath fluids Ordered: [X  ] Yes  [  ] Not indicated 2/2 _________    Risks Benefits Annotation:     CARDIAC CATH: Risks & benefits of procedure and sedation and risks and benefits for the alternative therapy have been explained to the patient and/or HCP in layman’s terms including but not limited to: allergic reaction, bleeding, infection, arrhythmia, respiratory compromise, renal and vascular compromise, limb damage, MI, CVA, emergent CABG/Vascular Surgery and death. Informed consent obtained and in chart.

## 2021-10-11 NOTE — PROGRESS NOTE ADULT - ASSESSMENT
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 [place]) who presented with atypical chest pain, trop neg x2, ECG with TWI in inferior leads, plan for cardiac cath with Dr. Boyle today.

## 2021-10-11 NOTE — DISCHARGE NOTE PROVIDER - NSDCCPCAREPLAN_GEN_ALL_CORE_FT
PRINCIPAL DISCHARGE DIAGNOSIS  Diagnosis: Chest pain  Assessment and Plan of Treatment: You underwent a cardiac angiogram and received a stent to the ___ artery. PLEASE CONTINUE ASPIRIN 81MG DAILY AND PLAVIX 75MG DAILY. DO NOT STOP THESE MEDICATIONS FOR ANY REASON AS THEY ARE KEEPING YOUR STENT OPEN AND PREVENTING A HEART ATTACK. Avoid strenuous activity or heavy lifting for the next five days. Do not take a bath or swim for the next five days; you may shower. For any bleeding or hematoma formation (hardened blood collection under the skin) at the access site of ____ please hold pressure and go to the emergency room. Please follow up with Dr. Enriquez in 1-2 weeks. For recurrent chest pain, please call your doctor or go to the emergency room.      SECONDARY DISCHARGE DIAGNOSES  Diagnosis: Colon malignancy  Assessment and Plan of Treatment: Please follow up with Dr. Freed in 1-2 weeks.    Diagnosis: Hyperlipidemia  Assessment and Plan of Treatment: Please increase your home Crestor 10 mg to 40 mg once daily to keep your cholesterol low. High cholesterol contributes to heart disease.     PRINCIPAL DISCHARGE DIAGNOSIS  Diagnosis: Chest pain  Assessment and Plan of Treatment: You underwent a cardiac angiogram and received a stent to the proximal ramus artery and balloon inflation to the right posterior descending artery where you previously had a stent. PLEASE CONTINUE ASPIRIN 81MG DAILY AND PLAVIX 75MG DAILY. DO NOT STOP THESE MEDICATIONS FOR ANY REASON AS THEY ARE KEEPING YOUR STENT OPEN AND PREVENTING A HEART ATTACK. Avoid strenuous activity or heavy lifting for the next five days. Do not take a bath or swim for the next five days; you may shower. For any bleeding or hematoma formation (hardened blood collection under the skin) at the access site of wrist please hold pressure and go to the emergency room.  - Please follow up with Dr. Enriquez in 1-2 weeks. For recurrent chest pain, please call your doctor or go to the emergency room.  -These results were called into Dr. Freed's office. He was informed to discuss length of Aspirin and Plavix therapy with Dr. Katz. It will likely be a minimum of 8 weeks - 3 months. Please follow up with him as to when your should start holding the Aspirin and Plavix for your surgery.  We have provided you with a prescription for cardiac rehab which is medically supervised exercise program for your heart and has been shown to improve the quantity and quality of life of people with heart disease like yours. You should attend cardiac rehab 3 times per week for 12 weeks. We have provided you with a list of nearby facilities. Please call your insurance carrier to determine which of these facilities are covered under your plan. Please bring this prescription with you to your follow up appointment with your cardiologist who can then further assist you to enroll into a cardiac rehab program.      SECONDARY DISCHARGE DIAGNOSES  Diagnosis: Colon malignancy  Assessment and Plan of Treatment: Please follow up with Dr. Freed in 1-2 weeks. Discuss the Aspirin and Plavix therapy as above    Diagnosis: Hyperlipidemia  Assessment and Plan of Treatment: We increased your home Crestor 10 mg to 20 mg once daily to keep your cholesterol low. High cholesterol contributes to heart disease.    Diagnosis: HTN (hypertension)  Assessment and Plan of Treatment: Please continue your home Metoprolol succinate 25mg daily and new Losartan 25mg daily. All new prescription were sent to your preferred pharmacy.

## 2021-10-11 NOTE — DISCHARGE NOTE PROVIDER - HOSPITAL COURSE
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 Patient reports pain started last evening. He had one short lived episode and then resolved spontaneously. Patient reports pain came back this morning while making breakfast. Pain has waxed and whined all day. Pain comes for about 15-20 minutes and resolves spontaneously. Pain is a pressure in the center of his chest with no radiation. He has had some mild SOB while pain occurs. Nausea has been present as well. He denies palpitations, dizziness, edema or syncope. Pain in 2015 prior to PCI was much worse but with some similarities. Initial troponin was negative and 12 Lead EKG shows SR with NSST changes. D-Dimer was elevated at 534. CTA was performed and was negative for PE. There was coronary atherosclerosis present as well as 1.8 cystic lesion at the right posterior pericardium.    - Trop neg x3. ECHO 10/9/21: LVEF 55-60%,no significant valvular disease.  CXR 10/8/21: No acute cardiopulmonary disease process. Episode of SSCP improved with NTG paste on 10/9 & 10/10/21. ECG 10/10/21: NSR @ 73 BPM with TWI/Q waves in inferior leads (flattened T waves in inferior leads on admit). CT PE 10/8/21: no PE. Cardiac cath @ [facility] with Dr. Boyle 10/2015: LMCA normal, LAD mild LI, pLCx LI, pRamus 50%, dual PDA system with 100% prox thrombotic occlusion of most inferior PDA s/p thrombectomy/JENN with insertion of IABP x 18 hours post-procedure. Spoke to Surgeon Dr. Cameron's team 10/11 AM, OK to go forward with cath, will have to push colectomy if severe disease requiring stent, communicate with Dr. Cameron's team re: results of cath. s/p cardiac cath on 10/11/21: _______.     Takes Crestor 10  mg PO QD at home, Atorvastatin titrated to 80 mg PO QD (plan to discharge on Crestor 40 mg PO QD). c/w Toprol XL 25 mg PO QD. Will discharge on Losartan 25 mg PO QD 2/2 previous NSTEMI.    Noted recent appendectomy with finding of appendicle malignancy & plan for collectomy +/- Chemo [place] [facility] [place]. Findings of cath communicated with Dr. Freed's team.     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 Discussed with Dr. Katz & curbsided Dr. Rodriguez, likely not causing sx.      No significant events on telemetry overnight.  Patient has been medically cleared for discharge as per  ________. Patient has been given appropriate discharge instructions including medication regimen, access site management and follow up. Medications that patient needs refills on have been e-prescribed to preferred pharmacy.     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 Patient reports pain started last evening. He had one short lived episode and then resolved spontaneously. Patient reports pain came back this morning while making breakfast. Pain has waxed and whined all day. Pain comes for about 15-20 minutes and resolves spontaneously. Pain is a pressure in the center of his chest with no radiation. He has had some mild SOB while pain occurs. Nausea has been present as well. He denies palpitations, dizziness, edema or syncope. Pain in 2015 prior to PCI was much worse but with some similarities. Initial troponin was negative and 12 Lead EKG shows SR with NSST changes. D-Dimer was elevated at 534. CTA was performed and was negative for PE. There was coronary atherosclerosis present as well as 1.8 cystic lesion at the right posterior pericardium.    On admission, Trop neg x3. ECHO 10/9/21: LVEF 55-60%,no significant valvular disease. CXR 10/8/21: No acute cardiopulmonary disease process. Episode of SSCP improved with NTG paste on 10/9 & 10/10/21. ECG 10/10/21: NSR @ 73 BPM with TWI/Q waves in inferior leads (flattened T waves in inferior leads on admit). CT PE 10/8/21: no PE. 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 Discussed with Dr. Katz & curbsided Dr. Rodriguez, likely not causing sx. Spoke to Surgeon Dr. Cameron's team 10/11 AM, OK to go forward with cath, will have to push colectomy if severe disease requiring stent, communicate with Dr. Cameron's team re: results of cath. S/p cardiac cath on 10/11/21: PTCA RPDA (80%) ISR, JENN x 1 pRamus (85%), dLCx: 60%, mLAD: 50%, myocardial bridge, EF: normal. EDP: 9. R TR due at 9 PM.     Patient seen and examined at the bedside. No complaints at this time. AM labs significant for WBC of 12.32 however ASX and afebrile, likely reactive. HD stable. No overnight events on tele. R radial access site stable with distal pulses to baseline. As per Dr. Katz, patient is stable for discharge home on ASA 81mg QD, Plavix 75mg QD, Toprol XL 25mg QD, Crestor 20mg QD, Losartan 25mg QD with instrction to follow up with Dr. Gopi Enriquez in 1-2 weeks as well as Dr. Freed to discuss surgical plan. Of note: Dr. Freed made aware of stent placement and it will be determined between him and Dr. Katz length of DAPT therapy (8 weeks vs 3 months).             Cardiac Rehab (STEMI/NSTEMI/ACS/Unstable Angina/CHF/Post PCI):            *Education on benefits of Cardiac Rehab provided to patient: Yes/No         *Referral and Prescription Given for Cardiac Rehab : Yes/No.  If No, Why Not?         *Pt given list of locations & instructed to contact their insurance company to review list of participating providers    Statin Prescribed (STEMI/NSTEMI/UA &/OR PCI this admission):  Yes/No; If No, Why Not?  DAPT: Prescriptions for Aspirin/Plavix/Brilinta/Effient e-prescribed to patient's pharmacy Yes/No__.                     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 Patient reports pain started last evening. He had one short lived episode and then resolved spontaneously. Patient reports pain came back this morning while making breakfast. Pain has waxed and whined all day. Pain comes for about 15-20 minutes and resolves spontaneously. Pain is a pressure in the center of his chest with no radiation. He has had some mild SOB while pain occurs. Nausea has been present as well. He denies palpitations, dizziness, edema or syncope. Pain in 2015 prior to PCI was much worse but with some similarities. Initial troponin was negative and 12 Lead EKG shows SR with NSST changes. D-Dimer was elevated at 534. CTA was performed and was negative for PE. There was coronary atherosclerosis present as well as 1.8 cystic lesion at the right posterior pericardium.    On admission, Trop neg x3. ECHO 10/9/21: LVEF 55-60%,no significant valvular disease. CXR 10/8/21: No acute cardiopulmonary disease process. Episode of SSCP improved with NTG paste on 10/9 & 10/10/21. ECG 10/10/21: NSR @ 73 BPM with TWI/Q waves in inferior leads (flattened T waves in inferior leads on admit). CT PE 10/8/21: no PE. 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 Discussed with Dr. Katz & curbsided Dr. Rodriguez, likely not causing sx. Spoke to Surgeon Dr. Cameron's team 10/11 AM, OK to go forward with cath, will have to push colectomy if severe disease requiring stent, communicate with Dr. Cameron's team re: results of cath. S/p cardiac cath on 10/11/21: PTCA RPDA (80%) ISR, JENN x 1 pRamus (85%), dLCx: 60%, mLAD: 50%, myocardial bridge, EF: normal. EDP: 9. R TR due at 9 PM.     Patient seen and examined at the bedside. No complaints at this time. AM labs significant for WBC of 12.32 however ASX and afebrile, likely reactive. HD stable. No overnight events on tele. R radial access site stable with distal pulses to baseline. As per Dr. Katz, patient is stable for discharge home on ASA 81mg QD, Plavix 75mg QD, Toprol XL 25mg QD, Crestor 20mg QD, Losartan 25mg QD with instrction to follow up with Dr. Gopi Enriquez in 1-2 weeks as well as Dr. Freed to discuss surgical plan. Of note: Dr. Freed made aware of stent placement and it will be determined between him and Dr. Katz length of DAPT therapy (8 weeks vs 3 months).             Cardiac Rehab (STEMI/NSTEMI/ACS/Unstable Angina/CHF/Post PCI):            *Education on benefits of Cardiac Rehab provided to patient: Yes/No         *Referral and Prescription Given for Cardiac Rehab : Yes/No.  If No, Why Not?         *Pt given list of locations & instructed to contact their insurance company to review list of participating providers    Statin Prescribed (STEMI/NSTEMI/UA &/OR PCI this admission):  Yes/No; If No, Why Not?  DAPT: Prescriptions for Aspirin/Plavix/Brilinta/Effient e-prescribed to patient's pharmacy Yes/No__.      I was physically present for the key portions of the evaluation and management (E/M) service provided.  I have personally seen and examined this patient.  I have reviewed vitals, labs, medications, cardiac studies and remaining additional imaging.  I agree with the above discharge documentation which I have reviewed and edited where appropriate. Patient is hemodynamically stable and appropriate for discharge home on the above prescribed medications.  Close outpatient cardiology follow up is recommended within 1-2 weeks.    Kamille Katz MD   Cardiology Attending    35 minutes spent on total encounter; more than 50% of the visit was spent counseling and/or coordinating care by the attending physician, with plan of care discussed with the patient and cardiac team.

## 2021-10-11 NOTE — PROGRESS NOTE ADULT - SUBJECTIVE AND OBJECTIVE BOX
Interventional Cardiology PA Adult Progress Note    Subjective Assessment:  Pt seen and evaluated at bedside. Wants to get cath over with. Denies CP today. Denies SOB, dizziness, palpitations, N/V, abdominal pain. All other ROS otherwise negative except per subjective.   	  MEDICATIONS:  metoprolol succinate ER 25 milliGRAM(s) Oral daily  nitroglycerin    2% Ointment 0.5 Inch(s) Transdermal two times a day  diphenhydrAMINE   Injectable 50 milliGRAM(s) IV Push once  aluminum hydroxide/magnesium hydroxide/simethicone Suspension 30 milliLiter(s) Oral every 6 hours PRN  pantoprazole    Tablet 40 milliGRAM(s) Oral before breakfast  atorvastatin 80 milliGRAM(s) Oral at bedtime  aspirin enteric coated 81 milliGRAM(s) Oral daily  sodium chloride 0.9%. 500 milliLiter(s) IV Continuous <Continuous>      [PHYSICAL EXAM:  TELEMETRY:  T(C): 36.7 (10-11-21 @ 13:43), Max: 36.9 (10-10-21 @ 18:51)  HR: 80 (10-11-21 @ 08:52) (69 - 80)  BP: 132/84 (10-11-21 @ 08:52) (118/73 - 132/84)  RR: 17 (10-11-21 @ 08:52) (17 - 18)  SpO2: 99% (10-10-21 @ 17:42) (99% - 99%)  Wt(kg): --  I&O's Summary    10 Oct 2021 07:01  -  11 Oct 2021 07:00  --------------------------------------------------------  IN: 420 mL / OUT: 300 mL / NET: 120 mL    11 Oct 2021 07:01  -  11 Oct 2021 15:20  --------------------------------------------------------  IN: 0 mL / OUT: 0 mL / NET: 0 mL                   Appearance: Male sitting in bed in NAD	  HEENT:   EOMI	  Neck: - JVD  Cardiovascular: Normal S1 S2, No murmurs,   Respiratory: CTA B/L, no w/r/r  Gastrointestinal:  Soft, Non-tender, + BS	x4  Skin: Nitro paste to arm  Extremities: No pedal edema B/L  Vascular: DP 2+ B/L , PT 1+ B/L  Neurologic: Non-focal  Psychiatry: A & O x 3, Mood & affect appropriate                          13.4   7.76  )-----------( 188      ( 11 Oct 2021 08:04 )             42.7     10-11    142  |  105  |  13  ----------------------------<  128<H>  4.8   |  25  |  0.94    Ca    9.6      11 Oct 2021 08:04  Mg     2.0     10-11      PT/INR - ( 11 Oct 2021 08:04 )   PT: 12.7 sec;   INR: 1.06     PTT - ( 11 Oct 2021 08:04 )  PTT:32.2 sec

## 2021-10-11 NOTE — DISCHARGE NOTE PROVIDER - CARE PROVIDER_API CALL
Gopi Whitten  CARDIOVASCULAR MEDICINE  [address], [address]  [place] [address]  Phone: [phone]  Fax: [phone]  Follow Up Time:     Dr. Young Freed  [facility] Surgeon  [facility] [place] [address]  Phone: [phone]  Fax: (   )    -  Follow Up Time:

## 2021-10-12 ENCOUNTER — TRANSCRIPTION ENCOUNTER (OUTPATIENT)
Age: 46
End: 2021-10-12

## 2021-10-12 VITALS
DIASTOLIC BLOOD PRESSURE: 93 MMHG | RESPIRATION RATE: 16 BRPM | SYSTOLIC BLOOD PRESSURE: 141 MMHG | OXYGEN SATURATION: 97 % | HEART RATE: 71 BPM

## 2021-10-12 LAB
ANION GAP SERPL CALC-SCNC: 11 MMOL/L — SIGNIFICANT CHANGE UP (ref 5–17)
BUN SERPL-MCNC: 15 MG/DL — SIGNIFICANT CHANGE UP (ref 7–23)
CALCIUM SERPL-MCNC: 9.3 MG/DL — SIGNIFICANT CHANGE UP (ref 8.4–10.5)
CHLORIDE SERPL-SCNC: 106 MMOL/L — SIGNIFICANT CHANGE UP (ref 96–108)
CO2 SERPL-SCNC: 23 MMOL/L — SIGNIFICANT CHANGE UP (ref 22–31)
CREAT SERPL-MCNC: 0.98 MG/DL — SIGNIFICANT CHANGE UP (ref 0.5–1.3)
GLUCOSE SERPL-MCNC: 106 MG/DL — HIGH (ref 70–99)
HCT VFR BLD CALC: 40.9 % — SIGNIFICANT CHANGE UP (ref 39–50)
HGB BLD-MCNC: 12.6 G/DL — LOW (ref 13–17)
MAGNESIUM SERPL-MCNC: 2.2 MG/DL — SIGNIFICANT CHANGE UP (ref 1.6–2.6)
MCHC RBC-ENTMCNC: 28.1 PG — SIGNIFICANT CHANGE UP (ref 27–34)
MCHC RBC-ENTMCNC: 30.8 GM/DL — LOW (ref 32–36)
MCV RBC AUTO: 91.3 FL — SIGNIFICANT CHANGE UP (ref 80–100)
NRBC # BLD: 0 /100 WBCS — SIGNIFICANT CHANGE UP (ref 0–0)
PLATELET # BLD AUTO: 171 K/UL — SIGNIFICANT CHANGE UP (ref 150–400)
POTASSIUM SERPL-MCNC: 3.9 MMOL/L — SIGNIFICANT CHANGE UP (ref 3.5–5.3)
POTASSIUM SERPL-SCNC: 3.9 MMOL/L — SIGNIFICANT CHANGE UP (ref 3.5–5.3)
RBC # BLD: 4.48 M/UL — SIGNIFICANT CHANGE UP (ref 4.2–5.8)
RBC # FLD: 12.5 % — SIGNIFICANT CHANGE UP (ref 10.3–14.5)
SODIUM SERPL-SCNC: 140 MMOL/L — SIGNIFICANT CHANGE UP (ref 135–145)
WBC # BLD: 12.32 K/UL — HIGH (ref 3.8–10.5)
WBC # FLD AUTO: 12.32 K/UL — HIGH (ref 3.8–10.5)

## 2021-10-12 PROCEDURE — 85610 PROTHROMBIN TIME: CPT

## 2021-10-12 PROCEDURE — 99285 EMERGENCY DEPT VISIT HI MDM: CPT | Mod: 25

## 2021-10-12 PROCEDURE — 80061 LIPID PANEL: CPT

## 2021-10-12 PROCEDURE — 86140 C-REACTIVE PROTEIN: CPT

## 2021-10-12 PROCEDURE — 85025 COMPLETE CBC W/AUTO DIFF WBC: CPT

## 2021-10-12 PROCEDURE — C1874: CPT

## 2021-10-12 PROCEDURE — 86769 SARS-COV-2 COVID-19 ANTIBODY: CPT

## 2021-10-12 PROCEDURE — C1887: CPT

## 2021-10-12 PROCEDURE — C1725: CPT

## 2021-10-12 PROCEDURE — 84484 ASSAY OF TROPONIN QUANT: CPT

## 2021-10-12 PROCEDURE — 85652 RBC SED RATE AUTOMATED: CPT

## 2021-10-12 PROCEDURE — 85027 COMPLETE CBC AUTOMATED: CPT

## 2021-10-12 PROCEDURE — 71275 CT ANGIOGRAPHY CHEST: CPT | Mod: ME

## 2021-10-12 PROCEDURE — 85730 THROMBOPLASTIN TIME PARTIAL: CPT

## 2021-10-12 PROCEDURE — 83735 ASSAY OF MAGNESIUM: CPT

## 2021-10-12 PROCEDURE — 80048 BASIC METABOLIC PNL TOTAL CA: CPT

## 2021-10-12 PROCEDURE — 93005 ELECTROCARDIOGRAM TRACING: CPT

## 2021-10-12 PROCEDURE — 71046 X-RAY EXAM CHEST 2 VIEWS: CPT

## 2021-10-12 PROCEDURE — 99239 HOSP IP/OBS DSCHRG MGMT >30: CPT

## 2021-10-12 PROCEDURE — G1004: CPT

## 2021-10-12 PROCEDURE — 93306 TTE W/DOPPLER COMPLETE: CPT

## 2021-10-12 PROCEDURE — 80053 COMPREHEN METABOLIC PANEL: CPT

## 2021-10-12 PROCEDURE — 85379 FIBRIN DEGRADATION QUANT: CPT

## 2021-10-12 PROCEDURE — 87635 SARS-COV-2 COVID-19 AMP PRB: CPT

## 2021-10-12 PROCEDURE — 36415 COLL VENOUS BLD VENIPUNCTURE: CPT

## 2021-10-12 PROCEDURE — C1769: CPT

## 2021-10-12 PROCEDURE — C1894: CPT

## 2021-10-12 RX ORDER — LOSARTAN POTASSIUM 100 MG/1
1 TABLET, FILM COATED ORAL
Qty: 30 | Refills: 3
Start: 2021-10-12 | End: 2022-02-08

## 2021-10-12 RX ORDER — METOPROLOL TARTRATE 50 MG
1 TABLET ORAL
Qty: 30 | Refills: 3
Start: 2021-10-12 | End: 2022-02-08

## 2021-10-12 RX ORDER — ROSUVASTATIN CALCIUM 5 MG/1
1 TABLET ORAL
Qty: 0 | Refills: 0 | DISCHARGE

## 2021-10-12 RX ORDER — LOSARTAN POTASSIUM 100 MG/1
25 TABLET, FILM COATED ORAL DAILY
Refills: 0 | Status: DISCONTINUED | OUTPATIENT
Start: 2021-10-13 | End: 2021-10-12

## 2021-10-12 RX ORDER — ASPIRIN/CALCIUM CARB/MAGNESIUM 324 MG
1 TABLET ORAL
Qty: 30 | Refills: 11
Start: 2021-10-12 | End: 2022-10-06

## 2021-10-12 RX ORDER — ROSUVASTATIN CALCIUM 5 MG/1
1 TABLET ORAL
Qty: 30 | Refills: 3
Start: 2021-10-12 | End: 2022-02-08

## 2021-10-12 RX ORDER — CLOPIDOGREL BISULFATE 75 MG/1
1 TABLET, FILM COATED ORAL
Qty: 30 | Refills: 11
Start: 2021-10-12 | End: 2022-10-06

## 2021-10-12 RX ADMIN — PANTOPRAZOLE SODIUM 40 MILLIGRAM(S): 20 TABLET, DELAYED RELEASE ORAL at 07:20

## 2021-10-12 RX ADMIN — CLOPIDOGREL BISULFATE 75 MILLIGRAM(S): 75 TABLET, FILM COATED ORAL at 11:40

## 2021-10-12 RX ADMIN — Medication 81 MILLIGRAM(S): at 11:41

## 2021-10-12 NOTE — DISCHARGE NOTE NURSING/CASE MANAGEMENT/SOCIAL WORK - PATIENT PORTAL LINK FT
You can access the FollowMyHealth Patient Portal offered by [facility] by registering at the following website: [email or web address]. By joining [facility]’s FollowMyHealth portal, you will also be able to view your health information using other applications (apps) compatible with our system.

## 2021-10-12 NOTE — GOALS OF CARE CONVERSATION - ADVANCED CARE PLANNING - CONVERSATION DETAILS
Patient would like all necessary treatment options conducted - currently being worked up for colon malignancy   Verified full code status

## 2021-10-12 NOTE — CONSULT NOTE ADULT - SUBJECTIVE AND OBJECTIVE BOX
Preventive Cardiology Consultation Note    CHIEF COMPLAINT: s/p cardiac catheterization requiring cardiovascular prevention optimization and education    HISTORY OF PRESENT ILLNESS:  Mr. 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 LV function normal on TTE and LHC.     Currently feels well, asking about discharge.     Risk factors reviewed:  HL: has been on Crestor 10mg since 2015; outpt lipid profile with LDL in 90s in July  Obesity: reports has lost 20lbs recently with intentional dietary changes and continuing to lose    Lifestyle History:  Diet: cooks own food with olive oil, no cheese/dairy/fried foots  Mediterranean Diet Score (9 question survey) was 7  (8-9: optimal, 6-7: near-optimal, 4-5: suboptimal, 0-3: markedly suboptimal)  Exercise: Limited during pandemic, no exercise per se but walking.   Smoking: Patient denies any history of smoking.   +snoring, no witnessed apnea episodes but +daytime fatigue     PAST MEDICAL & SURGICAL HISTORY:  Myocardial infarction    HTN (hypertension)    Neoplasm of appendix    S/P appendectomy      FAMILY HISTORY:   Patient denies any first degree family history of premature CAD or CVA.     Allergies:   amoxicillin (Anaphylaxis)  peanuts (Pruritus; Angioedema)  shellfish (Swelling; Short breath)      HOME MEDICATIONS:  Home Medications:  aspirin 81 mg oral tablet, chewable: 1 tab(s) orally once a day (03 Oct 2015 13:14)  Crestor 10 mg oral tablet: 1 tab(s) orally once a day (10 Oct 2021 07:25)  metoprolol succinate 25 mg oral tablet, extended release: 1 tab(s) orally once a day (03 Oct 2015 13:14)    PHYSICAL EXAM:  T(C): 36.3 (10-12-21 @ 08:56), Max: 36.7 (10-11-21 @ 13:43)  T(F): 97.4 (10-12-21 @ 08:56), Max: 98.1 (10-11-21 @ 13:43)  HR: 66 (10-12-21 @ 08:21) (66 - 108)  BP: 138/87 (10-12-21 @ 08:21) (121/77 - 152/86)  RR: 18 (10-12-21 @ 08:21) (16 - 18)  SpO2: 97% (10-12-21 @ 08:21) (96% - 100%)  Wt(kg): --    	  Gen- awake, conversive  Head-NCAT; eyes: no xanthelasmas   Psych- normal affect; appearance, verbalizations, behaviors are appropriate     LABS:	                        12.6   12.32 )-----------( 171      ( 12 Oct 2021 08:49 )             40.9     10-12    140  |  106  |  15  ----------------------------<  106<H>  3.9   |  23  |  0.98    Ca    9.3      12 Oct 2021 08:49  Mg     2.2     10-12      Cholesterol, Serum: 173 mg/dL (10-10 @ 06:05)  HDL Cholesterol, Serum: 43 mg/dL (10-10 @ 06:05)  Triglycerides, Serum: 105 mg/dL (10-10 @ 06:05)  C-Reactive Protein, Serum: <3.0 mg/L (10-09 @ 15:17)  LDL Cholesterol, Calculated: 109 mg/dL  HbA1c: not checked here but recent outpt labs was 6%    ASSESSMENT/RECOMMENDATIONS: 	  Patient's dietary, exercise and overall lifestyle habits were reviewed. The concept of atherosclerosis and its systemic nature was discussed with a focus on the need to get all cardiovascular risk factors to goal. At this time, I would like to make the following recommendations to optimize atherosclerotic risk factors.     Secondary Prevention Recommendations:   Anti-platelet Therapy: DAPT per interventionalist recommendation.   Lipid Therapy: Agree with increasing statin dose to get LDL <70 with recheck lipid profile in 6-8 weeks as outpatient. Also, given young age at CAD diagnosis, would check lipoprotein (a) -- as pt likely being discharged soon, gave pt name to have checked with primary cardiologist. Implications discussed if returns elevated.   Hypertension: Blood pressures here slightly elevated--per pt are usually controlled.  Diet: Mediterranean Diet Score is 7, near-optimal. Some suggestions include increasing vegetables intake. Nutrition referral provided.   Exercise: Recommended gradually increasing activity to 30-45 minutes most days of the week once cleared by referring cardiologist. Patient will see at that point if he thinks he needs the additional cardiac rehab.   Obesity: The patient was advised about specific mechanisms such as reduced portions and increased activity for efforts toward weight loss -- has already been losing weight with this approach.   Glucometabolic State: Prediabetic based on recent labs -- lifestyle interventions discussed. Nutrition referral as above.   Sleep Apnea: Recommend outpt sleep study given snoring + daytime fatigue.     Thank you for the opportunity to see this patient. Please feel free to contact Prevention if there are any questions, or if you feel that your patient would benefit from continued follow-up visits with the Program.    Kendell Garcia MD  Cardiovascular Prevention

## 2021-10-18 DIAGNOSIS — E66.9 OBESITY, UNSPECIFIED: ICD-10-CM

## 2021-10-18 DIAGNOSIS — Z88.1 ALLERGY STATUS TO OTHER ANTIBIOTIC AGENTS STATUS: ICD-10-CM

## 2021-10-18 DIAGNOSIS — Z91.010 ALLERGY TO PEANUTS: ICD-10-CM

## 2021-10-18 DIAGNOSIS — I25.84 CORONARY ATHEROSCLEROSIS DUE TO CALCIFIED CORONARY LESION: ICD-10-CM

## 2021-10-18 DIAGNOSIS — Z95.5 PRESENCE OF CORONARY ANGIOPLASTY IMPLANT AND GRAFT: ICD-10-CM

## 2021-10-18 DIAGNOSIS — E78.5 HYPERLIPIDEMIA, UNSPECIFIED: ICD-10-CM

## 2021-10-18 DIAGNOSIS — C18.1 MALIGNANT NEOPLASM OF APPENDIX: ICD-10-CM

## 2021-10-18 DIAGNOSIS — I31.8 OTHER SPECIFIED DISEASES OF PERICARDIUM: ICD-10-CM

## 2021-10-18 DIAGNOSIS — R73.03 PREDIABETES: ICD-10-CM

## 2021-10-18 DIAGNOSIS — T82.855A STENOSIS OF CORONARY ARTERY STENT, INITIAL ENCOUNTER: ICD-10-CM

## 2021-10-18 DIAGNOSIS — Z91.013 ALLERGY TO SEAFOOD: ICD-10-CM

## 2021-10-18 DIAGNOSIS — Z79.82 LONG TERM (CURRENT) USE OF ASPIRIN: ICD-10-CM

## 2021-10-18 DIAGNOSIS — I25.10 ATHEROSCLEROTIC HEART DISEASE OF NATIVE CORONARY ARTERY WITHOUT ANGINA PECTORIS: ICD-10-CM

## 2021-10-18 DIAGNOSIS — I25.2 OLD MYOCARDIAL INFARCTION: ICD-10-CM

## 2021-10-18 DIAGNOSIS — R07.89 OTHER CHEST PAIN: ICD-10-CM

## 2021-10-18 DIAGNOSIS — I10 ESSENTIAL (PRIMARY) HYPERTENSION: ICD-10-CM
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
## 2021-10-18 DIAGNOSIS — Z82.49 FAMILY HISTORY OF ISCHEMIC HEART DISEASE AND OTHER DISEASES OF THE CIRCULATORY SYSTEM: ICD-10-CM

## 2022-01-24 ENCOUNTER — EMERGENCY (EMERGENCY)
Facility: HOSPITAL | Age: 47
LOS: 1 days | Discharge: HOME | End: 2022-01-24
Attending: EMERGENCY MEDICINE | Admitting: EMERGENCY MEDICINE
Payer: COMMERCIAL

## 2022-01-24 VITALS
RESPIRATION RATE: 18 BRPM | SYSTOLIC BLOOD PRESSURE: 146 MMHG | DIASTOLIC BLOOD PRESSURE: 91 MMHG | WEIGHT: 209 LBS | TEMPERATURE: 98 F | HEART RATE: 82 BPM | HEIGHT: 66 IN | OXYGEN SATURATION: 100 %

## 2022-01-24 VITALS
OXYGEN SATURATION: 100 % | DIASTOLIC BLOOD PRESSURE: 89 MMHG | TEMPERATURE: 98 F | SYSTOLIC BLOOD PRESSURE: 147 MMHG | RESPIRATION RATE: 18 BRPM | HEART RATE: 70 BPM

## 2022-01-24 DIAGNOSIS — Z90.49 ACQUIRED ABSENCE OF OTHER SPECIFIED PARTS OF DIGESTIVE TRACT: Chronic | ICD-10-CM

## 2022-01-24 DIAGNOSIS — R07.89 OTHER CHEST PAIN: ICD-10-CM

## 2022-01-24 DIAGNOSIS — I10 ESSENTIAL (PRIMARY) HYPERTENSION: ICD-10-CM

## 2022-01-24 DIAGNOSIS — I25.2 OLD MYOCARDIAL INFARCTION: ICD-10-CM

## 2022-01-24 DIAGNOSIS — Z79.82 LONG TERM (CURRENT) USE OF ASPIRIN: ICD-10-CM

## 2022-01-24 DIAGNOSIS — Z91.013 ALLERGY TO SEAFOOD: ICD-10-CM

## 2022-01-24 DIAGNOSIS — Z95.5 PRESENCE OF CORONARY ANGIOPLASTY IMPLANT AND GRAFT: Chronic | ICD-10-CM

## 2022-01-24 DIAGNOSIS — Z20.822 CONTACT WITH AND (SUSPECTED) EXPOSURE TO COVID-19: ICD-10-CM

## 2022-01-24 DIAGNOSIS — Z79.02 LONG TERM (CURRENT) USE OF ANTITHROMBOTICS/ANTIPLATELETS: ICD-10-CM

## 2022-01-24 DIAGNOSIS — I25.10 ATHEROSCLEROTIC HEART DISEASE OF NATIVE CORONARY ARTERY WITHOUT ANGINA PECTORIS: ICD-10-CM

## 2022-01-24 DIAGNOSIS — Z95.5 PRESENCE OF CORONARY ANGIOPLASTY IMPLANT AND GRAFT: ICD-10-CM

## 2022-01-24 DIAGNOSIS — Z91.010 ALLERGY TO PEANUTS: ICD-10-CM

## 2022-01-24 DIAGNOSIS — Z88.0 ALLERGY STATUS TO PENICILLIN: ICD-10-CM

## 2022-01-24 PROBLEM — I21.9 ACUTE MYOCARDIAL INFARCTION, UNSPECIFIED: Chronic | Status: ACTIVE | Noted: 2021-10-08

## 2022-01-24 PROBLEM — D49.0 NEOPLASM OF UNSPECIFIED BEHAVIOR OF DIGESTIVE SYSTEM: Chronic | Status: ACTIVE | Noted: 2021-10-08

## 2022-01-24 LAB
ALBUMIN SERPL ELPH-MCNC: 4.6 G/DL — SIGNIFICANT CHANGE UP (ref 3.3–5)
ALP SERPL-CCNC: 75 U/L — SIGNIFICANT CHANGE UP (ref 40–120)
ALT FLD-CCNC: 47 U/L — HIGH (ref 10–45)
ANION GAP SERPL CALC-SCNC: 11 MMOL/L — SIGNIFICANT CHANGE UP (ref 5–17)
APTT BLD: 30.7 SEC — SIGNIFICANT CHANGE UP (ref 27.5–35.5)
AST SERPL-CCNC: 42 U/L — HIGH (ref 10–40)
BASOPHILS # BLD AUTO: 0.02 K/UL — SIGNIFICANT CHANGE UP (ref 0–0.2)
BASOPHILS NFR BLD AUTO: 0.4 % — SIGNIFICANT CHANGE UP (ref 0–2)
BILIRUB SERPL-MCNC: 0.3 MG/DL — SIGNIFICANT CHANGE UP (ref 0.2–1.2)
BUN SERPL-MCNC: 8 MG/DL — SIGNIFICANT CHANGE UP (ref 7–23)
CALCIUM SERPL-MCNC: 9 MG/DL — SIGNIFICANT CHANGE UP (ref 8.4–10.5)
CHLORIDE SERPL-SCNC: 109 MMOL/L — HIGH (ref 96–108)
CO2 SERPL-SCNC: 24 MMOL/L — SIGNIFICANT CHANGE UP (ref 22–31)
CREAT SERPL-MCNC: 1.03 MG/DL — SIGNIFICANT CHANGE UP (ref 0.5–1.3)
EOSINOPHIL # BLD AUTO: 0.54 K/UL — HIGH (ref 0–0.5)
EOSINOPHIL NFR BLD AUTO: 10.9 % — HIGH (ref 0–6)
GLUCOSE SERPL-MCNC: 96 MG/DL — SIGNIFICANT CHANGE UP (ref 70–99)
HCT VFR BLD CALC: 37.2 % — LOW (ref 39–50)
HGB BLD-MCNC: 11.4 G/DL — LOW (ref 13–17)
IMM GRANULOCYTES NFR BLD AUTO: 0 % — SIGNIFICANT CHANGE UP (ref 0–1.5)
INR BLD: 1.04 — SIGNIFICANT CHANGE UP (ref 0.88–1.16)
LYMPHOCYTES # BLD AUTO: 1.82 K/UL — SIGNIFICANT CHANGE UP (ref 1–3.3)
LYMPHOCYTES # BLD AUTO: 36.6 % — SIGNIFICANT CHANGE UP (ref 13–44)
MCHC RBC-ENTMCNC: 27.7 PG — SIGNIFICANT CHANGE UP (ref 27–34)
MCHC RBC-ENTMCNC: 30.6 GM/DL — LOW (ref 32–36)
MCV RBC AUTO: 90.5 FL — SIGNIFICANT CHANGE UP (ref 80–100)
MONOCYTES # BLD AUTO: 0.57 K/UL — SIGNIFICANT CHANGE UP (ref 0–0.9)
MONOCYTES NFR BLD AUTO: 11.5 % — SIGNIFICANT CHANGE UP (ref 2–14)
NEUTROPHILS # BLD AUTO: 2.02 K/UL — SIGNIFICANT CHANGE UP (ref 1.8–7.4)
NEUTROPHILS NFR BLD AUTO: 40.6 % — LOW (ref 43–77)
NRBC # BLD: 0 /100 WBCS — SIGNIFICANT CHANGE UP (ref 0–0)
NT-PROBNP SERPL-SCNC: 67 PG/ML — SIGNIFICANT CHANGE UP (ref 0–300)
PLATELET # BLD AUTO: 217 K/UL — SIGNIFICANT CHANGE UP (ref 150–400)
POTASSIUM SERPL-MCNC: 4.6 MMOL/L — SIGNIFICANT CHANGE UP (ref 3.5–5.3)
POTASSIUM SERPL-SCNC: 4.6 MMOL/L — SIGNIFICANT CHANGE UP (ref 3.5–5.3)
PROT SERPL-MCNC: 7.4 G/DL — SIGNIFICANT CHANGE UP (ref 6–8.3)
PROTHROM AB SERPL-ACNC: 12.4 SEC — SIGNIFICANT CHANGE UP (ref 10.6–13.6)
RBC # BLD: 4.11 M/UL — LOW (ref 4.2–5.8)
RBC # FLD: 12.2 % — SIGNIFICANT CHANGE UP (ref 10.3–14.5)
SARS-COV-2 RNA SPEC QL NAA+PROBE: SIGNIFICANT CHANGE UP
SODIUM SERPL-SCNC: 144 MMOL/L — SIGNIFICANT CHANGE UP (ref 135–145)
TROPONIN T SERPL-MCNC: 0.01 NG/ML — SIGNIFICANT CHANGE UP (ref 0–0.01)
WBC # BLD: 4.97 K/UL — SIGNIFICANT CHANGE UP (ref 3.8–10.5)
WBC # FLD AUTO: 4.97 K/UL — SIGNIFICANT CHANGE UP (ref 3.8–10.5)

## 2022-01-24 PROCEDURE — 83880 ASSAY OF NATRIURETIC PEPTIDE: CPT

## 2022-01-24 PROCEDURE — 36415 COLL VENOUS BLD VENIPUNCTURE: CPT

## 2022-01-24 PROCEDURE — 71045 X-RAY EXAM CHEST 1 VIEW: CPT | Mod: 26

## 2022-01-24 PROCEDURE — U0003: CPT

## 2022-01-24 PROCEDURE — 99285 EMERGENCY DEPT VISIT HI MDM: CPT | Mod: 25

## 2022-01-24 PROCEDURE — 93005 ELECTROCARDIOGRAM TRACING: CPT

## 2022-01-24 PROCEDURE — 84484 ASSAY OF TROPONIN QUANT: CPT

## 2022-01-24 PROCEDURE — U0005: CPT

## 2022-01-24 PROCEDURE — 71045 X-RAY EXAM CHEST 1 VIEW: CPT

## 2022-01-24 PROCEDURE — 85730 THROMBOPLASTIN TIME PARTIAL: CPT

## 2022-01-24 PROCEDURE — 99285 EMERGENCY DEPT VISIT HI MDM: CPT

## 2022-01-24 PROCEDURE — 85025 COMPLETE CBC W/AUTO DIFF WBC: CPT

## 2022-01-24 PROCEDURE — 85610 PROTHROMBIN TIME: CPT

## 2022-01-24 PROCEDURE — 80053 COMPREHEN METABOLIC PANEL: CPT

## 2022-01-24 NOTE — ED ADULT NURSE NOTE - NSIMPLEMENTINTERV_GEN_ALL_ED
Implemented All Universal Safety Interventions:  [place] to call system. Call bell, personal items and telephone within reach. Instruct patient to call for assistance. Room bathroom lighting operational. Non-slip footwear when patient is off stretcher. Physically safe environment: no spills, clutter or unnecessary equipment. Stretcher in lowest position, wheels locked, appropriate side rails in place.

## 2022-01-24 NOTE — ED PROVIDER NOTE - CONSTITUTIONAL, MLM
normal... Well appearing, awake, alert, oriented to person, place, time/situation and in no apparent distress. Awake, alert, oriented to person, place, time/situation and in no apparent distress.

## 2022-01-24 NOTE — ED ADULT TRIAGE NOTE - CHIEF COMPLAINT QUOTE
pt c/o intermittent cp and SOB for 2 weeks now but worsening today. pt states " i've been in cardiac rehab and today the pain got worst" pmh MI, 2 cardiac stents. ekg in progress.

## 2022-01-24 NOTE — ED PROVIDER NOTE - OBJECTIVE STATEMENT
47 y/o M, PMHx of CAD with 2x cardiac stents (1st in 2018, 2nd in 2021),appendiceal CA, hemicolectomy, on cardiac rehab (M/W/F), presents with chest pain upon exertion last week. Patient reports chest discomfort with walking up stairs. Patient reported he takes Plavix and ASA, but stopped taking Plavix for 2 days to get colonoscopy. He reported chest discomfort prior to procedure. 45 y/o M, PMHx of CAD with 2x cardiac stents (1st in 2018, 2nd in Oct 2021),appendiceal CA (currently scheduled for hemicolectomy on 02/09/2022),, on cardiac rehab (M/W/F)where pt states he runs on a treadmill with heart monitor, presents with chest pain upon exertion last week. Patient reports chest discomfort with walking up stairs and exertion, pt states he is concerned because his surgery is coming up and wants to be sure everything is fine. Patient reported he takes Plavix and ASA, but stopped taking Plavix for 2 days to get colonoscopy in 2 days, but he has taken ASA, otherwise taking Plavix and ASA regularly. He reports no symptoms at rest, has no infectious symptoms or leg swelling.

## 2022-01-24 NOTE — ED PROVIDER NOTE - PATIENT PORTAL LINK FT
You can access the FollowMyHealth Patient Portal offered by [facility] by registering at the following website: [email or web address]. By joining [employer]’s FollowMyHealth portal, you will also be able to view your health information using other applications (apps) compatible with our system.

## 2022-01-24 NOTE — ED ADULT NURSE NOTE - OBJECTIVE STATEMENT
45 y/o male c/o chest pain without radiation with SOB that has worsened in the last 2 weeks, pt has cardiac hx, pt denies CP at this time. pt ambulated with steady gait.

## 2022-01-24 NOTE — ED PROVIDER NOTE - QRS
Problem: Pain  Goal: #Acceptable pain level achieved/maintained at rest using NRS/Faces  Description: This goal is used for patients who can self-report.  Acceptable means the level is at or below the identified comfort/function goal.  Outcome: Outcome Met, Continue evaluating goal progress toward completion  Goal: # Acceptable pain level achieved/maintained with activity using NRS/Faces  Description: This goal is used for patients who can self-report and are not achieving acceptable pain control during activity.  Outcome: Outcome Met, Continue evaluating goal progress toward completion  Goal: # Verbalizes understanding of pain management  Description: Documented in Patient Education Activity  Outcome: Outcome Met, Continue evaluating goal progress toward completion     Problem: VTE, Risk for  Goal: # No s/s of VTE  Outcome: Outcome Met, Continue evaluating goal progress toward completion  Goal: # Verbalizes understanding of VTE risk factors and prevention  Description: Document education using the patient education activity.   Outcome: Outcome Met, Continue evaluating goal progress toward completion     Problem: VTE (Actual)  Goal: # Verbalizes understanding of VTE and treatment plan  Description: Document education using the patient education activity.   Outcome: Outcome Met, Continue evaluating goal progress toward completion     Problem: At Risk for Falls  Goal: # Patient does not fall  Outcome: Outcome Met, Continue evaluating goal progress toward completion  Goal: # Takes action to control fall-related risks  Outcome: Outcome Met, Continue evaluating goal progress toward completion  Goal: # Verbalizes understanding of fall risk/precautions  Description: Document education using the patient education activity  Outcome: Outcome Met, Continue evaluating goal progress toward completion     Problem: At Risk for Injury Due to Fall  Goal: # Patient does not fall  Outcome: Outcome Met, Continue evaluating goal  progress toward completion  Goal: # Takes action to control condition specific risks  Outcome: Outcome Met, Continue evaluating goal progress toward completion  Goal: # Verbalizes understanding of fall-related injury personal risks  Description: Document education using the patient education activity  Outcome: Outcome Met, Continue evaluating goal progress toward completion     Problem: VTE, Risk for  Intervention: # Implement/maintain early mobilization  Description: Early mobilization is recommended including HOB up or dangle for 20 minutes during the first 24 hours; Progress activity 2-4 times/day each subsequent day, unless contraindicated.  Note: Done        84

## 2022-01-24 NOTE — ED PROVIDER NOTE - NSICDXPASTMEDICALHX_GEN_ALL_CORE_FT
PAST MEDICAL HISTORY:  HTN (hypertension)     Myocardial infarction     Neoplasm of appendix      PAST MEDICAL HISTORY:  HTN (hypertension)     Myocardial infarction     Neoplasm of appendix       CAD (coronary artery disease)     Cancer of appendix

## 2022-01-24 NOTE — ED PROVIDER NOTE - NSFOLLOWUPINSTRUCTIONS_ED_ALL_ED_FT
Dr. Enriquez will call you to arrange outpatient stress test.    Return to ED with worsening symptoms or other concerns.    Otherwise, take your regularly prescribed medications.          Chest Pain    WHAT YOU NEED TO KNOW:    Chest pain can be caused by a range of conditions, from not serious to life-threatening. Chest pain can be a symptom of a digestive problem, such as acid reflux or a stomach ulcer. An anxiety attack or a strong emotion, such as anger, can also cause chest pain. Infection, inflammation, or a fracture in the bones or cartilage in your chest can cause pain or discomfort. Sometimes chest pain or pressure is caused by poor blood flow to your heart (angina). Chest pain may also be caused by life-threatening conditions such as a heart attack or blood clot in your lungs.    DISCHARGE INSTRUCTIONS:    Call your local emergency number (911 in the [place]) or have someone call if:   •You have any of the following signs of a heart attack: ?Squeezing, pressure, or pain in your chest      ?You may also have any of the following: ?Discomfort or pain in your back, neck, jaw, stomach, or arm      ?Shortness of breath      ?Nausea or vomiting      ?Lightheadedness or a sudden cold sweat            Seek care immediately if:   •You have chest discomfort that gets worse, even with medicine.      •You cough or vomit blood.      •Your bowel movements are black or bloody.      •You cannot stop vomiting, or it hurts to swallow.      Call your doctor if:   •You have questions or concerns about your condition or care.          Medicines:   •Medicines may be given to treat the cause of your chest pain. Examples include pain medicine, anxiety medicine, or medicines to increase blood flow to your heart.      •Do not take certain medicines without asking your healthcare provider first. These include NSAIDs, herbal or vitamin supplements, or hormones (estrogen or progestin).      •Take your medicine as directed. Contact your healthcare provider if you think your medicine is not helping or if you have side effects. Tell him or her if you are allergic to any medicine. Keep a list of the medicines, vitamins, and herbs you take. Include the amounts, and when and why you take them. Bring the list or the pill bottles to follow-up visits. Carry your medicine list with you in case of an emergency.      Healthy living tips: The following are general healthy guidelines. If the cause of your chest pain is known, your healthcare provider will give you specific guidelines to follow.  •Do not smoke. Nicotine and other chemicals in cigarettes and cigars can cause lung and heart damage. Ask your healthcare provider for information if you currently smoke and need help to quit. E-cigarettes or smokeless tobacco still contain nicotine. Talk to your healthcare provider before you use these products.      •Choose a variety of healthy foods as often as possible. Include fresh, frozen, or canned fruits and vegetables. Also include low-fat dairy products, fish, chicken (without skin), and lean meats. Your healthcare provider or a dietitian can help you create meal plans. You may need to avoid certain foods or drinks if your pain is caused by a digestion problem.  Healthy Foods           •Lower your sodium (salt) intake. Limit foods that are high in sodium, such as canned foods, salty snacks, and cold cuts. If you add salt when you cook food, do not add more at the table. Choose low-sodium canned foods as much as possible.             •Drink plenty of water every day. Water helps your body to control temperature and blood pressure. Ask your healthcare provider how much water you should drink every day.      •Ask about activity. Your healthcare provider will tell you which activities to limit or avoid. Ask when you can drive, return to work, and have sex. Ask about the best exercise plan for you.      •Maintain a healthy weight. Ask your healthcare provider what a healthy weight is for you. Ask him or her to help you create a safe weight loss plan if you are overweight.      •Ask about vaccines you may need. Get the influenza (flu) vaccine every year as soon as recommended, usually in September or October. You may also need a pneumococcal vaccine to prevent pneumonia. The vaccine is usually given every 5 years, starting at age 65. Your healthcare provider can tell you if should get other vaccines, and when to get them.      Follow up with your healthcare provider within 72 hours, or as directed: You may need to return for more tests to find the cause of your chest pain. You may be referred to a specialist, such as a cardiologist or gastroenterologist. Write down your questions so you remember to ask them during your visits.       © Copyright [employer] 2022           back to top                          © Copyright [employer] 2022

## 2022-01-24 NOTE — ED PROVIDER NOTE - CLINICAL SUMMARY MEDICAL DECISION MAKING FREE TEXT BOX
47 y/o M with chest pain upon exertion. EKG shows no changes. Check cardiac enzymes. will contact pt's cardiologist, Dr. Whitten, to come up with plan since he has cath in Oct/2022. 45 y/o M with CAD, presents with chest pain upon exertion. Appears well at rest. EKG shows no changes. Check cardiac enzymes. will contact pt's cardiologist, Dr. Whitten, to come up with plan since he had cath in Oct/2021. 45 y/o M with CAD, presents with chest pain upon exertion. Appears well at rest. EKG shows no changes. Check cardiac enzymes. will contact pt's cardiologist, Dr. Whitten, to come up with plan since he had cath in Oct/2021.    Trop neg - discussed with Dr. Whitten who agrees pt can be discharged - but will follow up with outpt stress test prior to surgery on 2/9.  Office to call pt to arrange.

## 2022-01-24 NOTE — ED PROVIDER NOTE - NSICDXPASTSURGICALHX_GEN_ALL_CORE_FT
PAST SURGICAL HISTORY:  S/P appendectomy      PAST SURGICAL HISTORY:  S/P appendectomy       Coronary stent patent

## 2024-04-19 NOTE — ED ADULT NURSE NOTE - BREATH SOUNDS, LEFT
PATIENT REPORT WAS GIVEN TO Trini Lion RN  Discussed procedure, medications, allergies, pertinent med hx, local used, wounds, dressings, specimens obtained, family present. Answered questions from RN. Patient was handed off in stable condition.     
clear